# Patient Record
Sex: MALE | Race: WHITE | NOT HISPANIC OR LATINO
[De-identification: names, ages, dates, MRNs, and addresses within clinical notes are randomized per-mention and may not be internally consistent; named-entity substitution may affect disease eponyms.]

---

## 2017-05-08 PROBLEM — Z00.00 ENCOUNTER FOR PREVENTIVE HEALTH EXAMINATION: Status: ACTIVE | Noted: 2017-05-08

## 2017-05-12 ENCOUNTER — LABORATORY RESULT (OUTPATIENT)
Age: 67
End: 2017-05-12

## 2017-05-12 ENCOUNTER — APPOINTMENT (OUTPATIENT)
Dept: RHEUMATOLOGY | Facility: CLINIC | Age: 67
End: 2017-05-12

## 2017-05-12 VITALS
RESPIRATION RATE: 16 BRPM | TEMPERATURE: 98.2 F | BODY MASS INDEX: 26.32 KG/M2 | WEIGHT: 188 LBS | DIASTOLIC BLOOD PRESSURE: 96 MMHG | HEIGHT: 71 IN | HEART RATE: 64 BPM | SYSTOLIC BLOOD PRESSURE: 140 MMHG

## 2017-05-12 DIAGNOSIS — R51 HEADACHE: ICD-10-CM

## 2017-05-12 DIAGNOSIS — Z78.9 OTHER SPECIFIED HEALTH STATUS: ICD-10-CM

## 2017-05-12 DIAGNOSIS — Z87.891 PERSONAL HISTORY OF NICOTINE DEPENDENCE: ICD-10-CM

## 2017-05-12 DIAGNOSIS — Z80.0 FAMILY HISTORY OF MALIGNANT NEOPLASM OF DIGESTIVE ORGANS: ICD-10-CM

## 2017-05-12 DIAGNOSIS — M19.90 UNSPECIFIED OSTEOARTHRITIS, UNSPECIFIED SITE: ICD-10-CM

## 2017-05-12 DIAGNOSIS — Z82.61 FAMILY HISTORY OF ARTHRITIS: ICD-10-CM

## 2017-05-13 PROBLEM — R51 SCALP TENDERNESS: Status: ACTIVE | Noted: 2017-05-13

## 2017-05-13 LAB
APPEARANCE: CLEAR
BACTERIA: NEGATIVE
BASOPHILS # BLD AUTO: 0.03 K/UL
BASOPHILS NFR BLD AUTO: 0.4 %
BILIRUBIN URINE: NEGATIVE
BLOOD URINE: NEGATIVE
CK SERPL-CCNC: 46 U/L
COLOR: YELLOW
CRP SERPL-MCNC: <0.2 MG/DL
ENA SS-A AB SER IA-ACNC: <0.2 AL
ENA SS-B AB SER IA-ACNC: <0.2 AL
EOSINOPHIL # BLD AUTO: 0.24 K/UL
EOSINOPHIL NFR BLD AUTO: 3.4 %
FERRITIN SERPL-MCNC: 335 NG/ML
FOLATE SERPL-MCNC: 13.1 NG/ML
GLUCOSE QUALITATIVE U: NORMAL MG/DL
HAV IGM SER QL: NONREACTIVE
HBV CORE IGM SER QL: NONREACTIVE
HBV SURFACE AG SER QL: NONREACTIVE
HCT VFR BLD CALC: 42.3 %
HCV AB SER QL: NONREACTIVE
HCV S/CO RATIO: 0.09 S/CO
HGB BLD-MCNC: 14.3 G/DL
IMM GRANULOCYTES NFR BLD AUTO: 0.3 %
KETONES URINE: NEGATIVE
LDH SERPL-CCNC: 142 U/L
LEUKOCYTE ESTERASE URINE: NEGATIVE
LYMPHOCYTES # BLD AUTO: 1.85 K/UL
LYMPHOCYTES NFR BLD AUTO: 26.6 %
MAGNESIUM SERPL-MCNC: 2 MG/DL
MAN DIFF?: NORMAL
MCHC RBC-ENTMCNC: 22.2 PG
MCHC RBC-ENTMCNC: 33.8 GM/DL
MCV RBC AUTO: 65.7 FL
MICROSCOPIC-UA: NORMAL
MONOCYTES # BLD AUTO: 0.6 K/UL
MONOCYTES NFR BLD AUTO: 8.6 %
NEUTROPHILS # BLD AUTO: 4.22 K/UL
NEUTROPHILS NFR BLD AUTO: 60.7 %
NITRITE URINE: NEGATIVE
PH URINE: 5.5
PHOSPHATE SERPL-MCNC: 2.6 MG/DL
PLATELET # BLD AUTO: 215 K/UL
PROTEIN URINE: NEGATIVE MG/DL
RBC # BLD: 6.44 M/UL
RBC # BLD: 6.44 M/UL
RBC # FLD: 17.1 %
RED BLOOD CELLS URINE: 0 /HPF
RETICS # AUTO: 1.7 %
RETICS AGGREG/RBC NFR: 107.4 K/UL
RHEUMATOID FACT SER QL: 10 IU/ML
SPECIFIC GRAVITY URINE: 1.02
SQUAMOUS EPITHELIAL CELLS: 0 /HPF
T3 SERPL-MCNC: 86 NG/DL
T3RU NFR SERPL: 0.96 INDEX
T4 SERPL-MCNC: 6.7 UG/DL
THYROGLOB AB SERPL-ACNC: <20 IU/ML
THYROPEROXIDASE AB SERPL IA-ACNC: 278 IU/ML
TSH SERPL-ACNC: 2.93 UIU/ML
URATE SERPL-MCNC: 5 MG/DL
UROBILINOGEN URINE: NORMAL MG/DL
VIT B12 SERPL-MCNC: 325 PG/ML
WBC # FLD AUTO: 6.96 K/UL
WESR: 2
WHITE BLOOD CELLS URINE: 1 /HPF

## 2017-05-14 LAB
ANA PAT FLD IF-IMP: ABNORMAL
ANA SER IF-ACNC: ABNORMAL
CCP AB SER IA-ACNC: <8 UNITS
DSDNA AB SER-ACNC: <12 IU/ML
IF BLOCK AB SER QL: NORMAL
MPO AB + PR3 PNL SER: NORMAL
RF+CCP IGG SER-IMP: NEGATIVE

## 2017-05-15 LAB
ALBUMIN SERPL ELPH-MCNC: 4.6 G/DL
ALP BLD-CCNC: 74 U/L
ALT SERPL-CCNC: 10 U/L
ANION GAP SERPL CALC-SCNC: 14 MMOL/L
AST SERPL-CCNC: 15 U/L
BILIRUB SERPL-MCNC: 0.8 MG/DL
BUN SERPL-MCNC: 14 MG/DL
CALCIUM SERPL-MCNC: 9.8 MG/DL
CHLORIDE SERPL-SCNC: 100 MMOL/L
CO2 SERPL-SCNC: 24 MMOL/L
CREAT SERPL-MCNC: 0.93 MG/DL
GLUCOSE SERPL-MCNC: 92 MG/DL
POTASSIUM SERPL-SCNC: 4.8 MMOL/L
PROT SERPL-MCNC: 7.2 G/DL
SODIUM SERPL-SCNC: 138 MMOL/L

## 2017-05-16 LAB
ACE BLD-CCNC: 37 U/L
B BURGDOR IGG+IGM SER QL IB: NORMAL
C3 SERPL-MCNC: 115 MG/DL
C4 SERPL-MCNC: 18 MG/DL
DEPRECATED KAPPA LC FREE/LAMBDA SER: 0.96 RATIO
IGA SER QL IEP: 392 MG/DL
IGG SER QL IEP: 970 MG/DL
IGM SER QL IEP: 64 MG/DL
KAPPA LC CSF-MCNC: 1.77 MG/DL
KAPPA LC SERPL-MCNC: 1.7 MG/DL
M PROTEIN SPEC IFE-MCNC: NORMAL

## 2017-05-17 LAB
MYELOPEROXIDASE AB SER QL IA: <5 UNITS
MYELOPEROXIDASE CELLS FLD QL: NEGATIVE
PROTEINASE3 AB SER IA-ACNC: <5 UNITS
PROTEINASE3 AB SER-ACNC: NEGATIVE

## 2017-05-23 ENCOUNTER — TRANSCRIPTION ENCOUNTER (OUTPATIENT)
Age: 67
End: 2017-05-23

## 2017-05-27 LAB
HGB A MFR BLD: 87.8 %
HGB A2 MFR BLD: 2.3 %
HGB FRACT BLD-IMP: NORMAL
HGB OTHER MFR BLD ELPH: 9.9 %
HGB S BLD QL SOLY: NEGATIVE

## 2017-06-26 ENCOUNTER — APPOINTMENT (OUTPATIENT)
Dept: RHEUMATOLOGY | Facility: CLINIC | Age: 67
End: 2017-06-26

## 2017-06-26 VITALS
TEMPERATURE: 98.4 F | SYSTOLIC BLOOD PRESSURE: 140 MMHG | HEART RATE: 66 BPM | WEIGHT: 188 LBS | HEIGHT: 71 IN | DIASTOLIC BLOOD PRESSURE: 90 MMHG | BODY MASS INDEX: 26.32 KG/M2 | RESPIRATION RATE: 17 BRPM | OXYGEN SATURATION: 98 %

## 2017-06-26 DIAGNOSIS — Z86.69 PERSONAL HISTORY OF OTHER DISEASES OF THE NERVOUS SYSTEM AND SENSE ORGANS: ICD-10-CM

## 2017-06-26 DIAGNOSIS — M21.951 UNSPECIFIED ACQUIRED DEFORMITY OF RIGHT THIGH: ICD-10-CM

## 2017-06-26 DIAGNOSIS — M21.961 UNSPECIFIED ACQUIRED DEFORMITY OF RIGHT LOWER LEG: ICD-10-CM

## 2017-06-26 DIAGNOSIS — M25.50 PAIN IN UNSPECIFIED JOINT: ICD-10-CM

## 2017-06-26 DIAGNOSIS — M21.952 UNSPECIFIED ACQUIRED DEFORMITY OF RIGHT THIGH: ICD-10-CM

## 2017-06-26 RX ORDER — DICLOFENAC SODIUM 75 MG/1
75 TABLET, DELAYED RELEASE ORAL
Refills: 0 | Status: DISCONTINUED | COMMUNITY
End: 2017-06-26

## 2017-07-24 ENCOUNTER — OUTPATIENT (OUTPATIENT)
Dept: OUTPATIENT SERVICES | Facility: HOSPITAL | Age: 67
LOS: 1 days | End: 2017-07-24
Payer: MEDICARE

## 2017-07-24 ENCOUNTER — APPOINTMENT (OUTPATIENT)
Dept: RADIOLOGY | Facility: CLINIC | Age: 67
End: 2017-07-24

## 2017-07-24 DIAGNOSIS — M25.50 PAIN IN UNSPECIFIED JOINT: ICD-10-CM

## 2017-07-24 PROCEDURE — 71020: CPT | Mod: 26

## 2017-07-24 PROCEDURE — 71046 X-RAY EXAM CHEST 2 VIEWS: CPT

## 2017-07-27 DIAGNOSIS — J39.8 OTHER SPECIFIED DISEASES OF UPPER RESPIRATORY TRACT: ICD-10-CM

## 2017-07-30 PROBLEM — J39.8 TRACHEAL DEVIATION: Status: ACTIVE | Noted: 2017-07-30

## 2017-08-07 ENCOUNTER — CLINICAL ADVICE (OUTPATIENT)
Age: 67
End: 2017-08-07

## 2017-08-18 ENCOUNTER — OUTPATIENT (OUTPATIENT)
Dept: OUTPATIENT SERVICES | Facility: HOSPITAL | Age: 67
LOS: 1 days | End: 2017-08-18
Payer: MEDICARE

## 2017-08-18 ENCOUNTER — APPOINTMENT (OUTPATIENT)
Dept: CT IMAGING | Facility: CLINIC | Age: 67
End: 2017-08-18
Payer: MEDICARE

## 2017-08-18 DIAGNOSIS — J39.8 OTHER SPECIFIED DISEASES OF UPPER RESPIRATORY TRACT: ICD-10-CM

## 2017-08-18 PROCEDURE — 71250 CT THORAX DX C-: CPT | Mod: 26

## 2017-08-18 PROCEDURE — 71250 CT THORAX DX C-: CPT

## 2017-09-09 ENCOUNTER — TRANSCRIPTION ENCOUNTER (OUTPATIENT)
Age: 67
End: 2017-09-09

## 2018-01-11 ENCOUNTER — TRANSCRIPTION ENCOUNTER (OUTPATIENT)
Age: 68
End: 2018-01-11

## 2018-01-17 ENCOUNTER — TRANSCRIPTION ENCOUNTER (OUTPATIENT)
Age: 68
End: 2018-01-17

## 2018-01-23 ENCOUNTER — APPOINTMENT (OUTPATIENT)
Dept: MRI IMAGING | Facility: CLINIC | Age: 68
End: 2018-01-23
Payer: MEDICARE

## 2018-01-23 ENCOUNTER — OUTPATIENT (OUTPATIENT)
Dept: OUTPATIENT SERVICES | Facility: HOSPITAL | Age: 68
LOS: 1 days | End: 2018-01-23
Payer: MEDICARE

## 2018-01-23 DIAGNOSIS — Z00.8 ENCOUNTER FOR OTHER GENERAL EXAMINATION: ICD-10-CM

## 2018-01-23 PROCEDURE — 73721 MRI JNT OF LWR EXTRE W/O DYE: CPT | Mod: 26,RT

## 2018-01-23 PROCEDURE — 73721 MRI JNT OF LWR EXTRE W/O DYE: CPT

## 2020-05-13 ENCOUNTER — APPOINTMENT (OUTPATIENT)
Dept: DERMATOLOGY | Facility: CLINIC | Age: 70
End: 2020-05-13

## 2020-06-01 ENCOUNTER — TRANSCRIPTION ENCOUNTER (OUTPATIENT)
Age: 70
End: 2020-06-01

## 2020-06-22 ENCOUNTER — TRANSCRIPTION ENCOUNTER (OUTPATIENT)
Age: 70
End: 2020-06-22

## 2020-06-29 ENCOUNTER — RESULT REVIEW (OUTPATIENT)
Age: 70
End: 2020-06-29

## 2020-06-30 ENCOUNTER — APPOINTMENT (OUTPATIENT)
Dept: DERMATOLOGY | Facility: CLINIC | Age: 70
End: 2020-06-30
Payer: MEDICARE

## 2020-06-30 PROCEDURE — 11102 TANGNTL BX SKIN SINGLE LES: CPT

## 2020-06-30 PROCEDURE — 99203 OFFICE O/P NEW LOW 30 MIN: CPT | Mod: 25

## 2020-07-19 ENCOUNTER — TRANSCRIPTION ENCOUNTER (OUTPATIENT)
Age: 70
End: 2020-07-19

## 2020-07-20 ENCOUNTER — OUTPATIENT (OUTPATIENT)
Dept: OUTPATIENT SERVICES | Facility: HOSPITAL | Age: 70
LOS: 1 days | End: 2020-07-20
Payer: MEDICARE

## 2020-07-20 ENCOUNTER — APPOINTMENT (OUTPATIENT)
Dept: RADIOLOGY | Facility: CLINIC | Age: 70
End: 2020-07-20
Payer: MEDICARE

## 2020-07-20 DIAGNOSIS — Z00.8 ENCOUNTER FOR OTHER GENERAL EXAMINATION: ICD-10-CM

## 2020-07-20 DIAGNOSIS — M81.0 AGE-RELATED OSTEOPOROSIS WITHOUT CURRENT PATHOLOGICAL FRACTURE: ICD-10-CM

## 2020-07-20 PROCEDURE — 77080 DXA BONE DENSITY AXIAL: CPT

## 2020-07-20 PROCEDURE — 77080 DXA BONE DENSITY AXIAL: CPT | Mod: 26

## 2020-07-23 DIAGNOSIS — Z01.818 ENCOUNTER FOR OTHER PREPROCEDURAL EXAMINATION: ICD-10-CM

## 2020-07-27 ENCOUNTER — APPOINTMENT (OUTPATIENT)
Dept: DISASTER EMERGENCY | Facility: CLINIC | Age: 70
End: 2020-07-27

## 2020-07-28 ENCOUNTER — APPOINTMENT (OUTPATIENT)
Dept: CARDIOLOGY | Facility: CLINIC | Age: 70
End: 2020-07-28
Payer: MEDICARE

## 2020-07-28 VITALS
BODY MASS INDEX: 27.3 KG/M2 | OXYGEN SATURATION: 95 % | WEIGHT: 195 LBS | SYSTOLIC BLOOD PRESSURE: 159 MMHG | HEIGHT: 71 IN | DIASTOLIC BLOOD PRESSURE: 87 MMHG | HEART RATE: 62 BPM

## 2020-07-28 DIAGNOSIS — Z82.49 FAMILY HISTORY OF ISCHEMIC HEART DISEASE AND OTHER DISEASES OF THE CIRCULATORY SYSTEM: ICD-10-CM

## 2020-07-28 DIAGNOSIS — R07.9 CHEST PAIN, UNSPECIFIED: ICD-10-CM

## 2020-07-28 LAB — SARS-COV-2 N GENE NPH QL NAA+PROBE: NOT DETECTED

## 2020-07-28 PROCEDURE — 99204 OFFICE O/P NEW MOD 45 MIN: CPT

## 2020-07-28 RX ORDER — ATORVASTATIN CALCIUM 40 MG/1
40 TABLET, FILM COATED ORAL DAILY
Qty: 90 | Refills: 3 | Status: ACTIVE | COMMUNITY
Start: 2020-07-28 | End: 1900-01-01

## 2020-07-28 RX ORDER — ASPIRIN 81 MG/1
81 TABLET ORAL
Qty: 30 | Refills: 11 | Status: ACTIVE | COMMUNITY
Start: 2020-07-28 | End: 1900-01-01

## 2020-07-30 ENCOUNTER — APPOINTMENT (OUTPATIENT)
Dept: DERMATOLOGY | Facility: CLINIC | Age: 70
End: 2020-07-30
Payer: MEDICARE

## 2020-07-30 PROCEDURE — 17311 MOHS 1 STAGE H/N/HF/G: CPT

## 2020-07-30 PROCEDURE — 17312 MOHS ADDL STAGE: CPT

## 2020-07-30 PROCEDURE — 14061 TIS TRNFR E/N/E/L10.1-30SQCM: CPT

## 2020-08-03 ENCOUNTER — APPOINTMENT (OUTPATIENT)
Dept: DERMATOLOGY | Facility: CLINIC | Age: 70
End: 2020-08-03
Payer: MEDICARE

## 2020-08-03 VITALS — TEMPERATURE: 97.3 F

## 2020-08-03 PROCEDURE — 99024 POSTOP FOLLOW-UP VISIT: CPT

## 2020-08-03 RX ORDER — MUPIROCIN 20 MG/G
2 OINTMENT TOPICAL
Qty: 1 | Refills: 0 | Status: ACTIVE | COMMUNITY
Start: 2020-08-03 | End: 1900-01-01

## 2020-08-13 ENCOUNTER — APPOINTMENT (OUTPATIENT)
Dept: DERMATOLOGY | Facility: CLINIC | Age: 70
End: 2020-08-13
Payer: MEDICARE

## 2020-08-13 DIAGNOSIS — C44.311 BASAL CELL CARCINOMA OF SKIN OF NOSE: ICD-10-CM

## 2020-08-13 PROCEDURE — 99024 POSTOP FOLLOW-UP VISIT: CPT

## 2020-08-21 ENCOUNTER — APPOINTMENT (OUTPATIENT)
Dept: CV DIAGNOSTICS | Facility: HOSPITAL | Age: 70
End: 2020-08-21

## 2020-08-21 ENCOUNTER — APPOINTMENT (OUTPATIENT)
Dept: CV DIAGNOSITCS | Facility: HOSPITAL | Age: 70
End: 2020-08-21

## 2020-08-21 ENCOUNTER — OUTPATIENT (OUTPATIENT)
Dept: OUTPATIENT SERVICES | Facility: HOSPITAL | Age: 70
LOS: 1 days | End: 2020-08-21
Payer: MEDICARE

## 2020-08-21 DIAGNOSIS — R07.9 CHEST PAIN, UNSPECIFIED: ICD-10-CM

## 2020-08-21 PROCEDURE — 93018 CV STRESS TEST I&R ONLY: CPT

## 2020-08-21 PROCEDURE — 93306 TTE W/DOPPLER COMPLETE: CPT

## 2020-08-21 PROCEDURE — 93017 CV STRESS TEST TRACING ONLY: CPT

## 2020-08-21 PROCEDURE — 93306 TTE W/DOPPLER COMPLETE: CPT | Mod: 26

## 2020-08-21 PROCEDURE — 93016 CV STRESS TEST SUPVJ ONLY: CPT

## 2020-09-17 ENCOUNTER — APPOINTMENT (OUTPATIENT)
Dept: DERMATOLOGY | Facility: CLINIC | Age: 70
End: 2020-09-17
Payer: MEDICARE

## 2020-09-17 DIAGNOSIS — Z85.828 PERSONAL HISTORY OF OTHER MALIGNANT NEOPLASM OF SKIN: ICD-10-CM

## 2020-09-17 PROCEDURE — 99024 POSTOP FOLLOW-UP VISIT: CPT

## 2020-09-18 PROBLEM — Z85.828 HISTORY OF BASAL CELL CARCINOMA (BCC): Status: ACTIVE | Noted: 2020-09-18

## 2020-10-12 PROBLEM — R07.9 CHEST PAIN: Status: ACTIVE | Noted: 2020-07-28

## 2020-10-12 NOTE — REVIEW OF SYSTEMS
Forms completed, signed, copy made for chart and faxed as requested.  Janae Holman,            [see HPI] : see HPI [Negative] : Heme/Lymph

## 2020-10-12 NOTE — DISCUSSION/SUMMARY
[FreeTextEntry1] : Mr. Bhat is a 69 year-old man with angina.\par \par Plan:\par 1. Chest pain: (unstable): TTE and stress test\par 2. HTN: monitor with off meds for now\par 3. HLD: Atorvastatin\par 4. Old records requested and reviewed with performing physician.\par 5. Primary and secondary prevention of cardiovascular and related conditions discussed at length, including but not limited to diet and lifestyle modification.\par 6. Patient to return to the office in 3 months.\par \par Thank you for allowing me to participate in the care of your patient. If you have any questions, please feel free to contact me at [phone] or via email at [email or web address].\par \par Sincerely,\par \par Candace Monk MD [facility]\par Director of Interventional Cardiology\par Director CHIP/ Program

## 2020-10-12 NOTE — PHYSICAL EXAM
[General Appearance - Well Developed] : well developed [Normal Appearance] : normal appearance [Well Groomed] : well groomed [General Appearance - Well Nourished] : well nourished [No Deformities] : no deformities [General Appearance - In No Acute Distress] : no acute distress [Normal Conjunctiva] : the conjunctiva exhibited no abnormalities [Eyelids - No Xanthelasma] : the eyelids demonstrated no xanthelasmas [Normal Oral Mucosa] : normal oral mucosa [No Oral Pallor] : no oral pallor [No Oral Cyanosis] : no oral cyanosis [Normal Jugular Venous A Waves Present] : normal jugular venous A waves present [Normal Jugular Venous V Waves Present] : normal jugular venous V waves present [No Jugular Venous Green A Waves] : no jugular venous green A waves [Heart Rate And Rhythm] : heart rate and rhythm were normal [Heart Sounds] : normal S1 and S2 [Murmurs] : no murmurs present [Respiration, Rhythm And Depth] : normal respiratory rhythm and effort [Exaggerated Use Of Accessory Muscles For Inspiration] : no accessory muscle use [Auscultation Breath Sounds / Voice Sounds] : lungs were clear to auscultation bilaterally [Abdomen Soft] : soft [Abdomen Tenderness] : non-tender [Abdomen Mass (___ Cm)] : no abdominal mass palpated [Abnormal Walk] : normal gait [Gait - Sufficient For Exercise Testing] : the gait was sufficient for exercise testing [Nail Clubbing] : no clubbing of the fingernails [Cyanosis, Localized] : no localized cyanosis [Petechial Hemorrhages (___cm)] : no petechial hemorrhages [Skin Color & Pigmentation] : normal skin color and pigmentation [] : no rash [No Venous Stasis] : no venous stasis [Skin Lesions] : no skin lesions [No Skin Ulcers] : no skin ulcer [No Xanthoma] : no  xanthoma was observed [Oriented To Time, Place, And Person] : oriented to person, place, and time [Affect] : the affect was normal [Mood] : the mood was normal [No Anxiety] : not feeling anxious

## 2020-10-12 NOTE — HISTORY OF PRESENT ILLNESS
[FreeTextEntry1] : Mr. Bhat is a 69 year-old man with known HLD and ?BPH who presents with angina/chest pain that is not always typical. He notes being able to walk but dyspnea occurs as well.

## 2020-10-12 NOTE — REASON FOR VISIT
[Consultation] : a consultation regarding [Angina Pectoris] : angina pectoris [Chest Pain] : chest pain

## 2021-07-28 ENCOUNTER — RX RENEWAL (OUTPATIENT)
Age: 71
End: 2021-07-28

## 2021-10-20 ENCOUNTER — TRANSCRIPTION ENCOUNTER (OUTPATIENT)
Age: 71
End: 2021-10-20

## 2022-03-14 ENCOUNTER — APPOINTMENT (OUTPATIENT)
Dept: DERMATOLOGY | Facility: CLINIC | Age: 72
End: 2022-03-14
Payer: MEDICARE

## 2022-03-14 PROCEDURE — 99213 OFFICE O/P EST LOW 20 MIN: CPT

## 2022-05-22 ENCOUNTER — NON-APPOINTMENT (OUTPATIENT)
Age: 72
End: 2022-05-22

## 2022-08-23 ENCOUNTER — APPOINTMENT (OUTPATIENT)
Dept: ULTRASOUND IMAGING | Facility: CLINIC | Age: 72
End: 2022-08-23

## 2022-08-23 ENCOUNTER — OUTPATIENT (OUTPATIENT)
Dept: OUTPATIENT SERVICES | Facility: HOSPITAL | Age: 72
LOS: 1 days | End: 2022-08-23
Payer: MEDICARE

## 2022-08-23 DIAGNOSIS — Z00.8 ENCOUNTER FOR OTHER GENERAL EXAMINATION: ICD-10-CM

## 2022-08-23 PROCEDURE — 93975 VASCULAR STUDY: CPT

## 2022-08-23 PROCEDURE — 76870 US EXAM SCROTUM: CPT | Mod: 26

## 2022-08-23 PROCEDURE — 93975 VASCULAR STUDY: CPT | Mod: 26

## 2022-08-23 PROCEDURE — 76870 US EXAM SCROTUM: CPT

## 2022-11-02 ENCOUNTER — APPOINTMENT (OUTPATIENT)
Dept: ORTHOPEDIC SURGERY | Facility: CLINIC | Age: 72
End: 2022-11-02

## 2022-11-02 VITALS — WEIGHT: 180 LBS | HEIGHT: 71 IN | BODY MASS INDEX: 25.2 KG/M2

## 2022-11-02 DIAGNOSIS — E78.00 PURE HYPERCHOLESTEROLEMIA, UNSPECIFIED: ICD-10-CM

## 2022-11-02 DIAGNOSIS — M25.512 PAIN IN LEFT SHOULDER: ICD-10-CM

## 2022-11-02 DIAGNOSIS — I10 ESSENTIAL (PRIMARY) HYPERTENSION: ICD-10-CM

## 2022-11-02 PROCEDURE — 72040 X-RAY EXAM NECK SPINE 2-3 VW: CPT

## 2022-11-02 PROCEDURE — 73030 X-RAY EXAM OF SHOULDER: CPT | Mod: LT

## 2022-11-02 PROCEDURE — 99204 OFFICE O/P NEW MOD 45 MIN: CPT

## 2022-11-02 NOTE — PHYSICAL EXAM
[NL (45)] : forward flexion 45 degrees [5___] : right grasp 5[unfilled]/5 [Left] : left shoulder [There are no fractures, subluxations or dislocations. No significant abnormalities are seen] : There are no fractures, subluxations or dislocations. No significant abnormalities are seen [de-identified] : A. General\par 1. .vit:\par Constitutional:\par - General Appearance:\par Unremarkable\par Body Habitus\par Well Developed\par Well Nourished\par Body Habitus\par No Deformities\par Well Groomed\par Ability To communicate:\par Normal\par Neurologic:\par Global sensation is intact to upper and lower extremities. See examination of Neck and/or Spine\par for exceptions.\par Orientation to Time, Place and Person is: Normal\par Mood And Affect is Normal\par Skin:\par - Head/Face, Right Upper/Lower Extremity, Left Upper/Lower Extremity: Normal\par See Examination of Neck and/or Spine for exceptions\par Cardiovascular:\par Peripheral Cardiovascular System is Normal\par Palpation of Lymph Nodes:\par Normal Palpation of lymph nodes in: Axilla, Cervical, Inguinal\par Abnormal Palpation of lymph nodes in: None [FreeTextEntry9] : ROM of the neck with crepitus [FreeTextEntry1] : cC-5 mild DDD, C5-7 moderate DDD with no listhesis  [de-identified] : extension 20 degrees [de-identified] : left lateral rotation 45 degrees [TWNoteComboBox6] : right lateral rotation 45 degrees [FreeTextEntry8] : no pain with ROM [] : negative tinel's [de-identified] : negative wallace

## 2022-11-02 NOTE — ASSESSMENT
[FreeTextEntry1] : 73 y/o M with cervical DDD and radiculopathy. Patient has failed all forms of conservative treatment including rest, OTC NSIADS, chiro care and HEP for greater than 6 weeks, and is now indicated for MRI of cervical spine to stenosis. Patient was provided with a referral for cervical physical therapy to work on stretching, strengthening and range of motion. Patient was provided with a cervical home exercise program. Patient will follow up after imaging. \par \par Prior to appointment and during encounter with patient extensive medical records were reviewed including but not limited to, hospital records, outpatient records, imaging results, and lab data.During this appointment the patient was examined, diagnoses were discussed and explained in a face to face manner. In addition extensive time was spent reviewing aforementioned diagnostic studies. Counseling including abnormal image results, differential diagnoses, treatment options, risk and benefits, lifestyle changes, current condition, and current medications was performed.Patient's comments, questions, and concerns were addressed and patient verbalized understanding. Based on this patient's presentation at our office, which is an orthopedic spine surgeon's office, this patient inherently / intrinsically has a risk, however minute, of developing issues such as Cauda equina syndrome, bowel and bladder changes, or progression of motor or neurological deficits such as paralysis which may be permanent.\par \par I, Katie Najera, attest that this documentation has been prepared under the direction and in the presence of provider Dr. Sutton.\par

## 2022-11-02 NOTE — HISTORY OF PRESENT ILLNESS
[Gradual] : gradual [4] : 4 [7] : 7 [Burning] : burning [Radiating] : radiating [Tingling] : tingling [Frequent] : frequent [Sitting] : sitting [Lying in bed] : lying in bed [Part time] : Work status: part time [de-identified] : 11/2/22: 71 y/o M with cervical spine pain for the past year. No injury/trauma. Patient states he has pain in the left upper extremity that travels into his fingers. Patient states he also experiences tingling/numbness as well a a burning sensation in the arm. Pain is affecting his sleeping. He has had chiro care with mild relief but continues to have pain daily. Has taken OTC NSAIDS with no relief.  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

## 2022-11-14 ENCOUNTER — RESULT REVIEW (OUTPATIENT)
Age: 72
End: 2022-11-14

## 2022-11-14 ENCOUNTER — APPOINTMENT (OUTPATIENT)
Dept: MRI IMAGING | Facility: CLINIC | Age: 72
End: 2022-11-14

## 2022-11-14 ENCOUNTER — OUTPATIENT (OUTPATIENT)
Dept: OUTPATIENT SERVICES | Facility: HOSPITAL | Age: 72
LOS: 1 days | End: 2022-11-14
Payer: MEDICARE

## 2022-11-14 DIAGNOSIS — M54.12 RADICULOPATHY, CERVICAL REGION: ICD-10-CM

## 2022-11-14 PROCEDURE — 72141 MRI NECK SPINE W/O DYE: CPT | Mod: MH

## 2022-11-14 PROCEDURE — 72141 MRI NECK SPINE W/O DYE: CPT | Mod: 26,MH

## 2022-11-23 ENCOUNTER — APPOINTMENT (OUTPATIENT)
Dept: ORTHOPEDIC SURGERY | Facility: CLINIC | Age: 72
End: 2022-11-23

## 2022-11-23 VITALS — HEIGHT: 71 IN | WEIGHT: 180 LBS | BODY MASS INDEX: 25.2 KG/M2

## 2022-11-23 PROCEDURE — 99214 OFFICE O/P EST MOD 30 MIN: CPT

## 2022-12-07 ENCOUNTER — APPOINTMENT (OUTPATIENT)
Dept: PAIN MANAGEMENT | Facility: CLINIC | Age: 72
End: 2022-12-07

## 2022-12-07 VITALS — WEIGHT: 180 LBS | BODY MASS INDEX: 25.2 KG/M2 | HEIGHT: 71 IN

## 2022-12-07 DIAGNOSIS — M54.12 RADICULOPATHY, CERVICAL REGION: ICD-10-CM

## 2022-12-07 DIAGNOSIS — M54.2 CERVICALGIA: ICD-10-CM

## 2022-12-07 DIAGNOSIS — G89.29 CERVICALGIA: ICD-10-CM

## 2022-12-07 PROCEDURE — 99204 OFFICE O/P NEW MOD 45 MIN: CPT

## 2022-12-07 RX ORDER — VALSARTAN 160 MG/1
160 TABLET, COATED ORAL
Qty: 90 | Refills: 0 | Status: ACTIVE | COMMUNITY
Start: 2022-01-17

## 2022-12-07 NOTE — HISTORY OF PRESENT ILLNESS
[Neck] : neck [5] : 5 [10] : 10 [Dull/Aching] : dull/aching [Intermittent] : intermittent [Household chores] : household chores [Nothing helps with pain getting better] : Nothing helps with pain getting better [Sitting] : sitting [Standing] : standing [Walking] : walking [FreeTextEntry1] : Initial HPI: \par \par 12/07/2022: This is MARTIN MEJIA, a pleasant 72 year-old male referred to my office by Dr. Sutton for initial evaluation of neck pain \par \par Location of maximal pain: left trap/shoulder blade\par Onset: 2 years, chronic but gradually worsening\par Provoking factors: lying on right side\par Palliating factors:  distraction\par Radiation pattern:  down left arm, anterior forearm, into all 5 fingers\par Associated symptoms: +paresthesias, +numbness, and +weakness in left arm.  +difficulty with fine motor on left\par Patient denies bowel/bladder incontinence, saddle anesthesia, fevers, chills, weight loss, or recent falls.  \par \par Current Pain Medications:\par Advil - minimal relief\par \par Past Pain Medications:\par None\par \par \par Prior Procedures/Treatments: \par None\par \par \par History of back surgery: \par None\par \par Blood thinners:\par ASA 81mg - primary PPX\par \par \par Physical Therapy:\par currently participating and doing HEP regularly\par \par --------------------------------------------------------\par Imaging Review:\par \par 11/2022 MRI C Spine ([facility]) \par \par C1/2: No central canal narrowing.\par C2/C3: Moderate left and mild to moderate facet hypertrophic changes. \par Moderate left foraminal narrowing. No central canal narrowing.\par C3/C4: Severe bilateral facet arthropathy. Moderate bilateral \par uncovertebral hypertrophy. Severe bilateral foraminal narrowing. Mild \par central canal narrowing.\par C4/C5: Severe left and mild right facet hypertrophic changes. Mild left \par uncovertebral hypertrophy. Severe left and moderate right foraminal \par narrowing. No central canal narrowing.\par C5/C6: Moderate posterior osseous ridging. Moderate bilateral facet \par hypertrophic changes. Moderate to severe bilateral foraminal narrowing. \par Mild to moderate central canal stenosis.\par C6/C7: Posterior osseous ridging effacing the ventral thecal sac. Facet \par and uncovertebral hypertrophy is worse on the left. Severe left and mild \par right foraminal narrowing. Mild central canal narrowing.\par C7/T1: No central canal or foraminal narrowing.\par \par ALIGNMENT: Mild reversal the normal cervical lordosis with apex at C4. \par Varying levels of disc space narrowing.\par CORD: No abnormal signal in the spinal cord\par MARROW: There is marked reactive bone marrow edema in the left C4 and C5 \par facets suggestive of a moderate to severe stress reaction.\par IMAGED BRAIN: Cervicomedullary junction is intact.\par PERIPHERAL/NECK SOFT TISSUES: Symmetric appearance of the paraspinal \par musculature. Prevertebral soft tissues are preserved.\par \par IMPRESSION:\par 1.  Moderate multilevel spondylosis with mild to moderate central canal \par narrowing at C5-6.\par 2.  Marked marrow edema in the left C4 and C5 facets suggestive of a \par moderate to severe stress reaction.\par \par \par \par \par All pertinent imaging independently reviewed and interpreted by me.  \par \par  [] : This patient has had an injection before: no [FreeTextEntry7] : left shoulder  [de-identified] : l mri

## 2022-12-07 NOTE — PHYSICAL EXAM
[de-identified] : General:  Awake and alert in no acute distress, \par Psych: normal mood and affect. \par HEENT: NC/AT, normal visual tracking\par Pulmonary: no resp distress, chest expansion appears symmetrical\par CV: extremities are warm and perfused\par Abd: non-distended\par Ext: no c/c/e\par \par Gait: WNL\par \par \par CERVICAL SPINE REGION:\par Inspection, cervical: normal, no listing of the head, no gross asymmetries.\par \par Active ROM of the cervical spine:\par Flexion:  full, painless\par Extension:     full, painless                          \par Right- Side-bending: full, painless\par Left-Side-bending: .full, painless		\par Rotation - Right:  full, painless    \par Rotation - Left: full, painless		\par Oblique extension: Right- full, painless \par Oblique extension: Left-  full, painless\par -ve Lhermitte's sign\par 		\par Palpation:  Tender at Cervical paraspinals\par 		\par Reflexes: Upper limbs:		RIGHT	   LEFT	\par Biceps	C5-6		                2+	    2+\par Brachioradialis	C5-6		2+                2+\par Triceps	C(6)7-8(1)		2+	    2+\par 		\par Strength, upper limbs: 	\par 5/5 in SA, EF, EE, WE, ADM, APB bilaterally \par 		\par Sensation: Upper limbs:\par Intact to light touch over C3-T1 bilateral UE dermatomes \par 		\par Tests for cervical radiculopathy/myelopathy: 	\par Spurling’s sign: negative bilaterally\par \par Long tract signs for myelopathy/UMN process:                          \par Castillo sign:                                          negative bilaterally			\par \par \par \par

## 2022-12-07 NOTE — ASSESSMENT
[FreeTextEntry1] : This is MARTIN MEJIA,  a 72 year-old male here for neck pain with impairment in ADLs and functionality.  The pain has not responded to conservative care including NSAID therapy and/or physical therapy.  There is no bleeding tendency, unstable medical condition, or systemic infection.\par \par Based on history and physical, I suspect there are likely multiple pain generators, including multilevel cervical spondylosis with mild/moderate central narrowing at C5/6 and facet arthropathy at C4/5. \par \par I discussed the above at length with the patient, including prognosis, complications, and red flag symptoms.  We discussed a graded and multidisciplinary treatment plan, including physical therapy/HEP, medications, and/or interventional procedures.  The risks and benefits of each of these were addressed and all questions answered to the patient's apparent satisfaction.  After this discussion, the following plan was developed with the patient: \par \par 1. PT: Emphasized importance of PT/HEP as a mainstay of treatment. Pt to continue. \par 2.  Medication(s): continue home meds\par 3.  Imaging/Labs: reviewed as above\par 4.  Procedure(s): Recommend C7/T1 FRITZ with fluoroscopic guidance. hold ASA 6d prior and 1d after (is on for primary ppx)\par 5.  Follow-Up: for procedure, then 2 weeks after. \par \par The risks, benefits and alternatives of the proposed procedure were explained in detail with the patient. The risks outlined include but are not limited to infection, bleeding, post- dural puncture headache (for CECILIA), nerve injury, a temporary increase in pain, failure to resolve symptoms, allergic reaction, and possible elevation of blood sugar in diabetics if using corticosteroid.  All questions were answered to patient's apparent satisfaction and he/she verbalized an understanding.\par \par Medications have been discussed and reconciled, adverse reactions and side effects have been reviewed with patient.  When appropriate, iSTOP/ has been checked and any aberrations discussed with patient.  \par \par

## 2022-12-12 ENCOUNTER — NON-APPOINTMENT (OUTPATIENT)
Age: 72
End: 2022-12-12

## 2022-12-18 ENCOUNTER — NON-APPOINTMENT (OUTPATIENT)
Age: 72
End: 2022-12-18

## 2023-01-10 ENCOUNTER — NON-APPOINTMENT (OUTPATIENT)
Age: 73
End: 2023-01-10

## 2023-03-06 DIAGNOSIS — M25.552 PAIN IN LEFT HIP: ICD-10-CM

## 2023-03-07 ENCOUNTER — APPOINTMENT (OUTPATIENT)
Dept: ORTHOPEDIC SURGERY | Facility: CLINIC | Age: 73
End: 2023-03-07
Payer: MEDICARE

## 2023-03-07 VITALS
WEIGHT: 185 LBS | HEIGHT: 71 IN | DIASTOLIC BLOOD PRESSURE: 82 MMHG | HEART RATE: 67 BPM | BODY MASS INDEX: 25.9 KG/M2 | SYSTOLIC BLOOD PRESSURE: 159 MMHG

## 2023-03-07 DIAGNOSIS — M70.62 TROCHANTERIC BURSITIS, LEFT HIP: ICD-10-CM

## 2023-03-07 PROCEDURE — 99204 OFFICE O/P NEW MOD 45 MIN: CPT

## 2023-03-07 PROCEDURE — 73502 X-RAY EXAM HIP UNI 2-3 VIEWS: CPT

## 2023-03-07 RX ORDER — DICLOFENAC SODIUM 1% 10 MG/G
1 GEL TOPICAL DAILY
Qty: 1 | Refills: 0 | Status: ACTIVE | COMMUNITY
Start: 2023-03-07 | End: 1900-01-01

## 2023-03-07 NOTE — PHYSICAL EXAM
[de-identified] : GENERAL APPEARANCE: Well nourished and hydrated, pleasant, alert, and oriented x 3. Appears their stated age. \par HEENT: Normocephalic, extraocular eye motion intact. Nasal septum midline. Oral cavity clear. External auditory canal clear. \par RESPIRATORY: Breath sounds clear and audible in all lobes. No wheezing, No accessory muscle use.\par CARDIOVASCULAR: No apparent abnormalities. No lower leg edema. No varicosities. Pedal pulses are palpable.\par NEUROLOGIC: Sensation is normal, no muscle weakness in the upper or lower extremities.\par DERMATOLOGIC: No apparent skin lesions, moist, warm, no rash.\par SPINE: Cervical spine appears normal and moves freely; thoracic spine appears normal and moves freely; lumbosacral spine appears normal and moves freely, normal, nontender.\par MUSCULOSKELETAL: Hands, wrists, and elbows are normal and move freely, shoulders are normal and move freely. \par Psychiatric: Oriented to person, place, and time, insight and judgement were intact and the affect was normal. \par Musculoskeletal: ambulates normally. Left hip exam showed no groin pain with SLR, ROM is full without pain, CAMERON negative, FADIR negative.  There is tenderness palpation over the left greater trochanter\par 5/5 motor strength in bilateral lower extremities. Sensory: Intact in bilateral lower extremities. DTRs: Biceps, brachioradialis, triceps, patellar, ankle and plantar 2+ and symmetric bilaterally. Pulses: dorsalis pedis, posterior tibial, femoral, popliteal, and radial 2+ and symmetric bilaterally. \par  [de-identified] : AP pelvis and 2 views of the left hip obtained the office today show no acute fracture or dislocation.  Mild degenerative changes noted with small osteophyte formation along the

## 2023-03-07 NOTE — HISTORY OF PRESENT ILLNESS
[Stable] : stable [Sitting] : sitting [Standing] : standing [Daily] : ~He/She~ states the symptoms seem to be occuring daily [Direct Pressure] : worsened by direct pressure [Walking] : worsened by walking [NSAIDs] : relieved by nonsteroidal anti-inflammatory drugs [de-identified] : 72 year old male with past medical history of HTN, HLD presents to office for initial evaluation of left hip pain x 1 month. Pain located to lateral aspect of hip, does not radiate. Exacerbated by laying on left side in  bed, getting in and out of the car, sitting for long periods of time. Has been NSAIDs for pain with minimal relief. States that he used to walk 3+ miles per day 5 days/week but has stopped over the past month due to his discomfort. Has not been to PT and denies a history of injections or surgeries in the hip. Denies use of assisitve device for ambulation. Denies any recent injuries, falls or trauma, mechanical symptoms of the hip, radiation of pain to back or knee, numbness/tingling, swelling. \par \par Denies history of smoking or drug use, admits to occasional alcohol consumption.  [Hip Movement] : not worsened by hip movement

## 2023-03-24 NOTE — DATA REVIEWED
[MRI] : MRI [Cervical Spine] : cervical spine [Report was reviewed and noted in the chart] : The report was reviewed and noted in the chart [I independently reviewed and interpreted images and report] : I independently reviewed and interpreted images and report [I reviewed the films/CD and additionally noted] : I reviewed the films/CD and additionally noted [FreeTextEntry1] : TAVIA 11/14/22 [facility]- C2-3 normal\par C3-4 moderate DDD mod b/l facet degeneration and a moderate to severe right foraminal stenosis and moderate to severe left foraminal stenosis\par C45 mild ddd and adv left facet degeneration with cyst formation interosseus, mod b/l foraminal stenosis\par C56 mod to adv b/l foraminal stenosis\par c67 mod to adv ddd with mod right and severe left foraminal stenosis\par c7-t1 moder spondylolisthesis

## 2023-03-24 NOTE — ASSESSMENT
[FreeTextEntry1] : 71 y/o M with cervical DDD and disc herniation. Patient will continue with PT and transition to HEP. Patient will follow up with pain mgmt for possible FRITZ, however, he is hesitant to proceed with injection therapy. Discussed possible  C4-7 ACDF if he is refractory to conservative care. Patient will follow up as needed. \par \par Prior to appointment and during encounter with patient extensive medical records were reviewed including but not limited to, hospital records, outpatient records, imaging results, and lab data.During this appointment the patient was examined, diagnoses were discussed and explained in a face to face manner. In addition extensive time was spent reviewing aforementioned diagnostic studies. Counseling including abnormal image results, differential diagnoses, treatment options, risk and benefits, lifestyle changes, current condition, and current medications was performed.Patient's comments, questions, and concerns were addressed and patient verbalized understanding. Based on this patient's presentation at our office, which is an orthopedic spine surgeon's office, this patient inherently / intrinsically has a risk, however minute, of developing issues such as Cauda equina syndrome, bowel and bladder changes, or progression of motor or neurological deficits such as paralysis which may be permanent.\par \par I, Katie Najera, attest that this documentation has been prepared under the direction and in the presence of provider Dr. Sutton.\par

## 2023-03-24 NOTE — PHYSICAL EXAM
[5___] : right grasp 5[unfilled]/5 [Left] : left shoulder [There are no fractures, subluxations or dislocations. No significant abnormalities are seen] : There are no fractures, subluxations or dislocations. No significant abnormalities are seen [de-identified] : A. General\par 1. .vit:\par Constitutional:\par - General Appearance:\par Unremarkable\par Body Habitus\par Well Developed\par Well Nourished\par Body Habitus\par No Deformities\par Well Groomed\par Ability To communicate:\par Normal\par Neurologic:\par Global sensation is intact to upper and lower extremities. See examination of Neck and/or Spine\par for exceptions.\par Orientation to Time, Place and Person is: Normal\par Mood And Affect is Normal\par Skin:\par - Head/Face, Right Upper/Lower Extremity, Left Upper/Lower Extremity: Normal\par See Examination of Neck and/or Spine for exceptions\par Cardiovascular:\par Peripheral Cardiovascular System is Normal\par Palpation of Lymph Nodes:\par Normal Palpation of lymph nodes in: Axilla, Cervical, Inguinal\par Abnormal Palpation of lymph nodes in: None [FreeTextEntry9] : ROM of the neck with crepitus [de-identified] : C5 and 6 para left\par \par C3-6 ACDF [TWNoteComboBox7] : forward flexion 20 degrees [de-identified] : extension 10 degrees [de-identified] : left lateral rotation 60 degrees [TWNoteComboBox6] : right lateral rotation 60 degrees [FreeTextEntry8] : no pain with ROM [] : negative tinel's [de-identified] : negative wallace

## 2023-03-24 NOTE — HISTORY OF PRESENT ILLNESS
[Gradual] : gradual [4] : 4 [7] : 7 [Burning] : burning [Radiating] : radiating [Tingling] : tingling [Frequent] : frequent [Sitting] : sitting [Lying in bed] : lying in bed [Part time] : Work status: part time [de-identified] : 11/23/22: Continues to have pain in the cervical spine as well as down the upper extremity. He presents for MRI review. He has gotten little relief from PT.  \par \par Prev doc:\par 11/2/22: 71 y/o M with cervical spine pain for the past year. No injury/trauma. Patient states he has pain in the left upper extremity that travels into his fingers. Patient states he also experiences tingling/numbness as well a a burning sensation in the arm. Pain is affecting his sleeping. He has had chiro care with mild relief but continues to have pain daily. Has taken OTC NSAIDS with no relief.  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

## 2023-03-29 ENCOUNTER — APPOINTMENT (OUTPATIENT)
Dept: ORTHOPEDIC SURGERY | Facility: CLINIC | Age: 73
End: 2023-03-29

## 2023-07-27 ENCOUNTER — APPOINTMENT (OUTPATIENT)
Dept: ORTHOPEDIC SURGERY | Facility: CLINIC | Age: 73
End: 2023-07-27
Payer: MEDICARE

## 2023-07-27 VITALS
BODY MASS INDEX: 26.6 KG/M2 | SYSTOLIC BLOOD PRESSURE: 148 MMHG | HEIGHT: 71 IN | DIASTOLIC BLOOD PRESSURE: 82 MMHG | HEART RATE: 71 BPM | WEIGHT: 190 LBS

## 2023-07-27 PROCEDURE — 99213 OFFICE O/P EST LOW 20 MIN: CPT | Mod: 25

## 2023-07-27 PROCEDURE — 73564 X-RAY EXAM KNEE 4 OR MORE: CPT | Mod: RT

## 2023-07-27 PROCEDURE — 20610 DRAIN/INJ JOINT/BURSA W/O US: CPT | Mod: RT

## 2023-07-27 NOTE — DISCUSSION/SUMMARY
[de-identified] : 25 minutes was spent reviewing the x-rays as well as discussing with the patient their clinical presentation, diagnosis and providing education.X-rays taken today reviewed with the patient.  He was shown the advanced degenerative changes.  This is most predominant of the lateral compartment.  The patient was offered and consented to a corticosteroid injection.  This was performed without complication.  He is recommended a trial course of meloxicam as well as physical therapy.  Prescriptions were provided.  Instruction education were provided.  The patient is recommended follow-up with a knee specialist here in about 6 weeks for reevaluation.  He will perform activities as tolerated.  He is encouraged to avoid high impact activities when exercising.  All questions answered to the patient's satisfaction.

## 2023-07-27 NOTE — PROCEDURE
[de-identified] : Procedure: Injection of the right knee. \par Indication:  Osteoarthritis. \par Risk and benefits were discussed with the patient. Potential complications include bleeding and infection. Verbal consent was obtained prior to the procedure. \par Chloraprep was used to prep the area. ethyl chloride spray was used as a topical anesthetic. Using sterile technique, the aspiration/injection needle was then directed from a medial aspect was used to inject 5 mL of 1% Lidocaine and 2 mL of 40mg/mL methylprednisolone. A bandage was applied. The patient tolerated the procedure well. \par Complications: none. \par Patient instructed to avoid strenuous activity for 1 day(s). \par Follow-up in the office as needed.

## 2023-07-27 NOTE — PHYSICAL EXAM
[de-identified] : The patient is conversive and in no apparent distress. The patient is alert and oriented to person, place, and time. Affect and mood appear normal. The head is normocephalic and atraumatic. Skin shows normal turgor with no evidence of eczema or psoriasis. No respiratory distress noted. Sensation grossly intact. MUSCULOSKELETAL:   SEE BELOW\par \par Right knee exam demonstrates skin that is clean, dry intact.  No significant surgical scars.  Mild soft tissue swelling.  Moderate effusion.  Normal temperature.  Valgus alignment of -3 degrees.  Passive range of motion is -2 degrees of extension to approximately 115 degrees of flexion.  Mild patellofemoral crepitus with terminal flexion.  Quadriceps and patella tendons are both intact.  No specific knee tenderness to palpation.  Recent soft tissue abrasions of the lower right anterior lower leg noted.  No signs of active infection.  Mild venous stasis.  No ulcerations secondary to venous stasis.  Gait demonstrates a mild antalgic limp. [de-identified] : 4 view right knee x-rays were reviewed.  Significant joint space narrowing of all compartments.  Shoot through x-ray demonstrates bone-on-bone DJD laterally.

## 2023-07-27 NOTE — HISTORY OF PRESENT ILLNESS
[de-identified] : Patient presents today for evaluation of right knee pain.  He had the knee pain for a few months now.  He reports recently trying to jump up onto a boat when the leg buckled.  He went into a deep knee flexion.  He states of increasing pain since that time.  The pain is fairly nonspecific.  He does report of a valgus knee presentation for an extended period of time.  Does report of crepitus.  He does report of swelling at times.  He is not regularly taking anti-inflammatories.  No past knee injections.  No past knee surgery.  No use of a cane or a walker or bracing.\par \par Review of Systems-\par Constitutional: No fever or chills. \par Cardiovascular: No orthopnea or chest pain\par Pulmonary: No shortness of breath. \par GI: No nausea or vomiting or abdominal pain.\par Musculoskeletal: see HPI \par Psychiatric: No anxiety and depression.

## 2023-08-07 ENCOUNTER — APPOINTMENT (OUTPATIENT)
Dept: DERMATOLOGY | Facility: CLINIC | Age: 73
End: 2023-08-07
Payer: MEDICARE

## 2023-08-07 PROCEDURE — 99213 OFFICE O/P EST LOW 20 MIN: CPT

## 2023-08-16 ENCOUNTER — OFFICE (OUTPATIENT)
Dept: URBAN - METROPOLITAN AREA CLINIC 113 | Facility: CLINIC | Age: 73
Setting detail: OPHTHALMOLOGY
End: 2023-08-16
Payer: MEDICARE

## 2023-08-16 DIAGNOSIS — H01.001: ICD-10-CM

## 2023-08-16 DIAGNOSIS — H04.123: ICD-10-CM

## 2023-08-16 DIAGNOSIS — H43.393: ICD-10-CM

## 2023-08-16 DIAGNOSIS — H01.004: ICD-10-CM

## 2023-08-16 PROCEDURE — 92250 FUNDUS PHOTOGRAPHY W/I&R: CPT | Performed by: OPHTHALMOLOGY

## 2023-08-16 PROCEDURE — 92014 COMPRE OPH EXAM EST PT 1/>: CPT | Performed by: OPHTHALMOLOGY

## 2023-08-16 ASSESSMENT — SUPERFICIAL PUNCTATE KERATITIS (SPK)
OD_SPK: T
OS_SPK: T

## 2023-08-16 ASSESSMENT — TONOMETRY
OS_IOP_MMHG: 15
OD_IOP_MMHG: 16

## 2023-08-16 ASSESSMENT — LID EXAM ASSESSMENTS
OD_BLEPHARITIS: T 1+
OS_BLEPHARITIS: T 1+

## 2023-08-16 ASSESSMENT — CONFRONTATIONAL VISUAL FIELD TEST (CVF)
OS_FINDINGS: FULL
OD_FINDINGS: FULL

## 2023-08-17 ASSESSMENT — REFRACTION_MANIFEST
OS_VA1: 20/25
OD_SPHERE: +0.25
OD_VA1: 20/25+
OS_ADD: +2.50
OD_AXIS: 090
OD_CYLINDER: -2.25
OD_VA2: 20/20
OS_SPHERE: -1.00
OD_AXIS: 095
OS_CYLINDER: -3.00
OD_CYLINDER: -0.25
OS_AXIS: 000
OD_VA1: 20/20
OS_VA2: 20/20
OD_VA1: 20/20
OS_AXIS: 090
OD_SPHERE: +0.25
OD_CYLINDER: SPH
OS_SPHERE: PLANO
OD_ADD: +2.50
OS_VA1: 20/20-1
OS_SPHERE: PL
OD_SPHERE: -2.25
OS_VA1: 20/20
OS_CYLINDER: SPH

## 2023-08-17 ASSESSMENT — REFRACTION_CURRENTRX
OD_OVR_VA: 20/
OD_ADD: +2.50
OD_VPRISM_DIRECTION: PROGS
OS_ADD: +2.50
OS_AXIS: 095
OD_AXIS: 108
OS_OVR_VA: 20/
OS_CYLINDER: -2.25
OD_CYLINDER: -2.75
OS_VPRISM_DIRECTION: PROGS
OD_SPHERE: -2.25
OS_SPHERE: -1.50

## 2023-08-17 ASSESSMENT — VISUAL ACUITY
OS_BCVA: 20/20
OD_BCVA: 20/20

## 2023-08-17 ASSESSMENT — AXIALLENGTH_DERIVED
OS_AL: 25.3277
OD_AL: 26.1711
OD_AL: 24.7005
OD_AL: 24.5941

## 2023-08-17 ASSESSMENT — SPHEQUIV_DERIVED
OS_SPHEQUIV: -2.5
OD_SPHEQUIV: -3.375
OD_SPHEQUIV: -0.125
OD_SPHEQUIV: 0.125

## 2023-08-17 ASSESSMENT — KERATOMETRY
OS_AXISANGLE_DEGREES: 119
OS_K2POWER_DIOPTERS: 42.25
METHOD_AUTO_MANUAL: AUTO
OD_K2POWER_DIOPTERS: 41.00
OD_AXISANGLE_DEGREES: 035
OD_K1POWER_DIOPTERS: 40.50
OS_K1POWER_DIOPTERS: 41.25

## 2023-08-17 ASSESSMENT — REFRACTION_AUTOREFRACTION
OS_SPHERE: PLANO
OS_AXIS: 084
OD_AXIS: 104
OD_SPHERE: +0.25
OS_CYLINDER: -0.75
OD_CYLINDER: -0.75

## 2023-09-08 ENCOUNTER — APPOINTMENT (OUTPATIENT)
Dept: ORTHOPEDIC SURGERY | Facility: CLINIC | Age: 73
End: 2023-09-08
Payer: MEDICARE

## 2023-09-08 VITALS
BODY MASS INDEX: 26.6 KG/M2 | WEIGHT: 190 LBS | HEIGHT: 71 IN | HEART RATE: 65 BPM | SYSTOLIC BLOOD PRESSURE: 127 MMHG | TEMPERATURE: 97.1 F | DIASTOLIC BLOOD PRESSURE: 82 MMHG

## 2023-09-08 DIAGNOSIS — M25.561 PAIN IN RIGHT KNEE: ICD-10-CM

## 2023-09-08 PROCEDURE — 20610 DRAIN/INJ JOINT/BURSA W/O US: CPT | Mod: RT

## 2023-09-08 PROCEDURE — 99214 OFFICE O/P EST MOD 30 MIN: CPT | Mod: 25

## 2023-09-08 PROCEDURE — 73562 X-RAY EXAM OF KNEE 3: CPT | Mod: RT

## 2023-09-08 NOTE — DISCUSSION/SUMMARY
[Medication Risks Reviewed] : Medication risks reviewed [Surgical risks reviewed] : Surgical risks reviewed [de-identified] : 72 M pt presents with severe tricompartmental osteoarthritis of the right knee. The nature of his condition and treatment options were discussed in detail. The pt is candidate for a right TKA. The surgery was discussed in detail. The pt is not interested in surgery at this time. The pt would like to exhaust conservative treatment options. The pt opted for a right knee cortisone injection which he tolerated well. The pt will f/u in 3 months for repeat injections if needed.   The patient is a 72 year individual with end stage arthritis of their right knee joint. Based upon the patient's continued symptoms and failure to respond to conservative treatment (including HA injections, cortisone injections, over the counter medications, and PT)I have recommended a right total knee arthroplasty for this patient. A long discussion took place with the patient describing what a total joint replacement is and what the procedure would entail. A total knee arthroplasty model, similar to the implant that was used during the operation, was utilized to demonstrate and to discuss the various bearing surfaces of the implants. The hospitalization and post-operative care and rehabilitation were also discussed. The use of perioperative antibiotics and DVT prophylaxis were discussed. The risk, benefits and alternatives to a surgical intervention were discussed at length with the patient. The patient was also advised of risks related to the medical comorbidities, elevated body mass index (BMI), and smoking where applicable. We discussed how to reduce modifiable risk factors and encouraged smoking cessation were applicable. A lengthy discussion took place to review the most common complications including but not limited to: deep vein thrombosis, pulmonary embolus, heart attack, stroke, infection, wound breakdown, numbness, damage to nerves, tendon, muscles, arteries or other blood vessels, death and other possible complications from anesthesia. The patient was told that we will take steps to minimize these risks by using sterile technique, antibiotics and DVT prophylaxis when appropriate and follow the patient postoperatively in the office setting to monitor progress. The possibility of recurrent pain, no improvement in pain and actual worsening of pain were also discussed with the patient. The discharge plan of care focused on the patient going home following surgery. The patient was encouraged to make the necessary arrangements to have someone stay with them when they are discharged home. Following discharge, a home care nurse was to the patient. The home care nurse would open the patients home care case and request home physical therapy services. Home physical therapy was to commence following discharge provided it was appropriate and covered by the health insurance benefit plan.  The benefits of surgery were discussed with the patient including the potential for improving his current clinical condition through operative intervention. Alternatives to surgical intervention including continued conservative management were also discussed in detail. All questions were answered to the satisfaction of the patient. The treatment plan of care, as well as a model of a total knee arthroplasty equivalent to the one that will be used for their total joint replacement, was shared with the patient. The patient agreed to the plan of care as well as the use of implants in their total joint replacement.

## 2023-09-08 NOTE — HISTORY OF PRESENT ILLNESS
[de-identified] : 71 y/o M pt presents with right knee pain. The pt has been having pain for many years. The pt has a hx of a right knee scope in 1990  He had worsening right knee pain for the past few months. The pt tried to jump up onto a boat when the leg buckled. He went into a deep knee flexion. He states of increasing pain since that time. The pain is fairly nonspecific. He does report of a valgus knee presentation for an extended period of time. Does report of crepitus. He does report of swelling at times. The pt was seen by Dr. Forbes and Niko KANG and recieved a cortisone injection which was helpful. The pt would like to have a repeat injection of the right knee before his trip to [place]. He takes Mobic which helps his pain.  [Worsening] : worsening [8] : a current pain level of 8/10 [4] : a minimum pain level of 4/10 [10] : a maximum pain level of 10/10 [Walking] : worsened by walking [Rest] : relieved by rest

## 2023-09-08 NOTE — PROCEDURE
[de-identified] : I injected the patient's right knee today with cortisone for primary osteoarthritis.  I discussed at length with the patient the planned steroid and lidocaine injection. The risks, benefits, convalescence and alternatives were reviewed. The possible side effects discussed included but were not limited to: pain, swelling, heat, bleeding, and redness. Symptoms are generally mild but if they are extensive then contact the office. Giving pain relievers by mouth such as NSAIDs or Tylenol can generally treat the reactions to steroid and lidocaine. Rare cases of infection have been noted. Rash, hives and itching may occur post injection. If you have muscle pain or cramps, flushing and or swelling of the face, rapid heart beat, nausea, dizziness, fever, chills, headache, difficulty breathing, swelling in the arms or legs, or have a prickly feeling of your skin, contact a health care provider immediately. Following this discussion, the knee was prepped with Alcohol and under sterile condition the 80 mg Depo-Medrol and 6 cc Lidocaine injection was performed with a 20 gauge needle through a superolateral injection site. The needle was introduced into the joint, aspiration was performed to ensure intra-articular placement and the medication was injected. Upon withdrawal of the needle the site was cleaned with alcohol and a band aid applied. The patient tolerated the injection well and there were no adverse effects. Post injection instructions included no strenuous activity for 24 hours, cryotherapy and if there are any adverse effects to contact the office.

## 2023-09-08 NOTE — PHYSICAL EXAM
[LE] : Sensory: Intact in bilateral lower extremities [ALL] : dorsalis pedis, posterior tibial, femoral, popliteal, and radial 2+ and symmetric bilaterally [Normal] : Alert and in no acute distress [Poor Appearance] : well-appearing [de-identified] : GENERAL APPEARANCE: Well nourished and hydrated, pleasant, alert, and oriented x 3. Appears their stated age.  HEENT: Normocephalic, extraocular eye motion intact. Nasal septum midline. Oral cavity clear. External auditory canal clear.  RESPIRATORY: Breath sounds clear and audible in all lobes. No wheezing, No accessory muscle use. CARDIOVASCULAR: No apparent abnormalities. No lower leg edema. No varicosities. Pedal pulses are palpable. NEUROLOGIC: Sensation is normal, no muscle weakness in the upper or lower extremities. DERMATOLOGIC: No apparent skin lesions, moist, warm, no rash. SPINE: Cervical spine appears normal and moves freely; thoracic spine appears normal and moves freely; lumbosacral spine appears normal and moves freely, normal, nontender. MUSCULOSKELETAL: Hands, wrists, and elbows are normal and move freely, shoulders are normal and move freely.  [de-identified] : Right knee exam shows medial jointline tenderness ROM 0-110 [de-identified] : Outside 3 view Xray of the right knee shows severe tricompartmental osteoarthritis

## 2023-09-08 NOTE — END OF VISIT
[FreeTextEntry3] : I, Petey Leach, acted solely as a scribe for Dr. Kristopher Banegas on 09/08/2023

## 2023-12-01 ENCOUNTER — APPOINTMENT (OUTPATIENT)
Dept: ORTHOPEDIC SURGERY | Facility: CLINIC | Age: 73
End: 2023-12-01
Payer: MEDICARE

## 2023-12-01 VITALS
SYSTOLIC BLOOD PRESSURE: 116 MMHG | WEIGHT: 187 LBS | DIASTOLIC BLOOD PRESSURE: 75 MMHG | HEART RATE: 70 BPM | HEIGHT: 71 IN | BODY MASS INDEX: 26.18 KG/M2

## 2023-12-01 PROCEDURE — 99213 OFFICE O/P EST LOW 20 MIN: CPT | Mod: 25

## 2023-12-01 PROCEDURE — 20610 DRAIN/INJ JOINT/BURSA W/O US: CPT | Mod: RT

## 2024-01-31 ENCOUNTER — OFFICE (OUTPATIENT)
Dept: URBAN - METROPOLITAN AREA CLINIC 113 | Facility: CLINIC | Age: 74
Setting detail: OPHTHALMOLOGY
End: 2024-01-31
Payer: MEDICARE

## 2024-01-31 DIAGNOSIS — H34.8320: ICD-10-CM

## 2024-01-31 DIAGNOSIS — H01.004: ICD-10-CM

## 2024-01-31 DIAGNOSIS — H43.393: ICD-10-CM

## 2024-01-31 DIAGNOSIS — H35.033: ICD-10-CM

## 2024-01-31 DIAGNOSIS — H01.001: ICD-10-CM

## 2024-01-31 DIAGNOSIS — H04.123: ICD-10-CM

## 2024-01-31 PROCEDURE — 92014 COMPRE OPH EXAM EST PT 1/>: CPT | Performed by: STUDENT IN AN ORGANIZED HEALTH CARE EDUCATION/TRAINING PROGRAM

## 2024-01-31 PROCEDURE — 92134 CPTRZ OPH DX IMG PST SGM RTA: CPT | Performed by: STUDENT IN AN ORGANIZED HEALTH CARE EDUCATION/TRAINING PROGRAM

## 2024-01-31 ASSESSMENT — REFRACTION_MANIFEST
OD_AXIS: 095
OS_SPHERE: -1.00
OD_AXIS: 090
OD_VA2: 20/20
OS_VA1: 20/25
OD_VA1: 20/20
OS_SPHERE: PLANO
OS_CYLINDER: -3.00
OS_VA1: 20/20
OD_VA1: 20/25+
OS_SPHERE: PL
OS_VA2: 20/20
OD_CYLINDER: -2.25
OS_CYLINDER: SPH
OS_AXIS: 090
OS_AXIS: 000
OD_ADD: +2.50
OD_SPHERE: -2.25
OD_CYLINDER: SPH
OD_VA1: 20/20
OS_ADD: +2.50
OD_CYLINDER: -0.25
OD_SPHERE: +0.25
OS_VA1: 20/20-1
OD_SPHERE: +0.25

## 2024-01-31 ASSESSMENT — LID EXAM ASSESSMENTS
OS_BLEPHARITIS: T 1+
OD_BLEPHARITIS: T 1+

## 2024-01-31 ASSESSMENT — REFRACTION_CURRENTRX
OS_ADD: +2.50
OD_OVR_VA: 20/
OD_AXIS: 108
OD_VPRISM_DIRECTION: PROGS
OS_OVR_VA: 20/
OS_AXIS: 095
OS_SPHERE: -1.50
OS_CYLINDER: -2.25
OS_VPRISM_DIRECTION: PROGS
OD_SPHERE: -2.25
OD_ADD: +2.50
OD_CYLINDER: -2.75

## 2024-01-31 ASSESSMENT — CONFRONTATIONAL VISUAL FIELD TEST (CVF)
OS_FINDINGS: FULL
OD_FINDINGS: FULL

## 2024-01-31 ASSESSMENT — SPHEQUIV_DERIVED
OD_SPHEQUIV: 0.125
OD_SPHEQUIV: -3.375
OS_SPHEQUIV: -2.5

## 2024-02-01 ENCOUNTER — OFFICE (OUTPATIENT)
Dept: URBAN - METROPOLITAN AREA CLINIC 115 | Facility: CLINIC | Age: 74
Setting detail: OPHTHALMOLOGY
End: 2024-02-01
Payer: MEDICARE

## 2024-02-01 DIAGNOSIS — H43.393: ICD-10-CM

## 2024-02-01 DIAGNOSIS — H35.033: ICD-10-CM

## 2024-02-01 DIAGNOSIS — H34.8320: ICD-10-CM

## 2024-02-01 PROCEDURE — 92134 CPTRZ OPH DX IMG PST SGM RTA: CPT | Performed by: OPHTHALMOLOGY

## 2024-02-01 PROCEDURE — 67028 INJECTION EYE DRUG: CPT | Mod: LT | Performed by: OPHTHALMOLOGY

## 2024-02-01 PROCEDURE — 92012 INTRM OPH EXAM EST PATIENT: CPT | Mod: 25 | Performed by: OPHTHALMOLOGY

## 2024-02-01 ASSESSMENT — REFRACTION_CURRENTRX
OS_AXIS: 095
OS_SPHERE: -1.50
OD_OVR_VA: 20/
OD_AXIS: 108
OS_CYLINDER: -2.25
OD_SPHERE: -2.25
OD_CYLINDER: -2.75
OS_VPRISM_DIRECTION: PROGS
OS_ADD: +2.50
OS_OVR_VA: 20/
OD_VPRISM_DIRECTION: PROGS
OD_ADD: +2.50

## 2024-02-01 ASSESSMENT — LID EXAM ASSESSMENTS
OS_BLEPHARITIS: T 1+
OD_BLEPHARITIS: T 1+

## 2024-02-01 ASSESSMENT — REFRACTION_AUTOREFRACTION
OD_AXIS: 104
OS_CYLINDER: -0.75
OD_SPHERE: +0.25
OD_CYLINDER: -0.75
OS_AXIS: 084
OS_SPHERE: PLANO

## 2024-02-01 ASSESSMENT — SUPERFICIAL PUNCTATE KERATITIS (SPK)
OD_SPK: T
OS_SPK: T

## 2024-02-01 ASSESSMENT — CONFRONTATIONAL VISUAL FIELD TEST (CVF)
OS_FINDINGS: FULL
OD_FINDINGS: FULL

## 2024-02-01 ASSESSMENT — SPHEQUIV_DERIVED: OD_SPHEQUIV: -0.125

## 2024-03-04 ENCOUNTER — OFFICE (OUTPATIENT)
Dept: URBAN - METROPOLITAN AREA CLINIC 115 | Facility: CLINIC | Age: 74
Setting detail: OPHTHALMOLOGY
End: 2024-03-04
Payer: MEDICARE

## 2024-03-04 DIAGNOSIS — H35.033: ICD-10-CM

## 2024-03-04 DIAGNOSIS — H34.8320: ICD-10-CM

## 2024-03-04 DIAGNOSIS — H43.393: ICD-10-CM

## 2024-03-04 PROCEDURE — 92134 CPTRZ OPH DX IMG PST SGM RTA: CPT | Performed by: OPHTHALMOLOGY

## 2024-03-04 PROCEDURE — 92012 INTRM OPH EXAM EST PATIENT: CPT | Performed by: OPHTHALMOLOGY

## 2024-03-04 ASSESSMENT — LID EXAM ASSESSMENTS
OS_BLEPHARITIS: T 1+
OD_BLEPHARITIS: T 1+

## 2024-03-04 ASSESSMENT — REFRACTION_CURRENTRX
OS_ADD: +2.50
OS_SPHERE: -1.50
OD_SPHERE: -2.25
OD_CYLINDER: -2.75
OS_VPRISM_DIRECTION: PROGS
OS_AXIS: 095
OD_ADD: +2.50
OD_VPRISM_DIRECTION: PROGS
OS_CYLINDER: -2.25
OD_OVR_VA: 20/
OS_OVR_VA: 20/
OD_AXIS: 108

## 2024-03-07 ENCOUNTER — APPOINTMENT (OUTPATIENT)
Dept: ORTHOPEDIC SURGERY | Facility: CLINIC | Age: 74
End: 2024-03-07
Payer: MEDICARE

## 2024-03-07 VITALS
HEART RATE: 64 BPM | DIASTOLIC BLOOD PRESSURE: 87 MMHG | HEIGHT: 71 IN | WEIGHT: 187 LBS | SYSTOLIC BLOOD PRESSURE: 130 MMHG | BODY MASS INDEX: 26.18 KG/M2

## 2024-03-07 PROCEDURE — 99213 OFFICE O/P EST LOW 20 MIN: CPT | Mod: 25

## 2024-03-07 PROCEDURE — 20610 DRAIN/INJ JOINT/BURSA W/O US: CPT | Mod: RT

## 2024-03-07 NOTE — DISCUSSION/SUMMARY
[Medication Risks Reviewed] : Medication risks reviewed [Surgical risks reviewed] : Surgical risks reviewed [de-identified] : 72 M pt presents with severe tricompartmental osteoarthritis of the right knee. The nature of his condition and treatment options were discussed in detail. The pt is candidate for a right TKA. The surgery was discussed in detail. The pt is not interested in surgery at this time.  Treatment options were reviewed.  patient had an aspiration of 15cc clear synovial fluid followed by cortisone injection in the office today.  Patient will follow-up in 3 months for reevaluation.  All questions answered.

## 2024-03-07 NOTE — PHYSICAL EXAM
[LE] : Sensory: Intact in bilateral lower extremities [ALL] : dorsalis pedis, posterior tibial, femoral, popliteal, and radial 2+ and symmetric bilaterally [Normal] : Alert and in no acute distress [Poor Appearance] : well-appearing [de-identified] : GENERAL APPEARANCE: Well nourished and hydrated, pleasant, alert, and oriented x 3. Appears their stated age.  HEENT: Normocephalic, extraocular eye motion intact. Nasal septum midline. Oral cavity clear. External auditory canal clear.  RESPIRATORY: Breath sounds clear and audible in all lobes. No wheezing, No accessory muscle use. CARDIOVASCULAR: No apparent abnormalities. No lower leg edema. No varicosities. Pedal pulses are palpable. NEUROLOGIC: Sensation is normal, no muscle weakness in the upper or lower extremities. DERMATOLOGIC: No apparent skin lesions, moist, warm, no rash. SPINE: Cervical spine appears normal and moves freely; thoracic spine appears normal and moves freely; lumbosacral spine appears normal and moves freely, normal, nontender. MUSCULOSKELETAL: Hands, wrists, and elbows are normal and move freely, shoulders are normal and move freely.  [de-identified] : Right knee exam shows medial joint line tenderness ROM 0-110 degrees, no instability, 5 out of 5 strength. moderate effusion [de-identified] : Outside 3 view Xray of the right knee shows severe tricompartmental osteoarthritis

## 2024-03-07 NOTE — HISTORY OF PRESENT ILLNESS
[de-identified] : 71 y/o M pt presents for follow-up evaluation of right knee pain. The pt has been having pain for many years. The pt has a hx of a right knee scope in 1990.  He reports 3 months of improvement following a previous cortisone injection given at his last office visit.  Nhe does note he has had episodes of increased swelling.  He did take meloxicam about a week ago when his knee felt particularly swollen and he does still feel he is swollen today.o recent injury.  He reports intermittent, mild to moderate diffuse dull aching pain about the knee.  Exacerbated with prolonged standing, walking and associated with general stiffness.  No catching, locking or buckling. [Worsening] : worsening [8] : a current pain level of 8/10 [10] : a maximum pain level of 10/10 [4] : a minimum pain level of 4/10 [Rest] : relieved by rest [Walking] : worsened by walking

## 2024-03-07 NOTE — PROCEDURE
[de-identified] : I iaspirated and injected the RIGHT knee.  I discussed at length with the patient the planned steroid and lidocaine injection. The risks, benefits, convalescence and alternatives were reviewed. The possible side effects discussed included but were not limited to: pain, swelling, heat, bleeding, and redness. Symptoms are generally mild but if they are extensive then contact the office. Giving pain relievers by mouth such as NSAIDs or Tylenol can generally treat the reactions to steroid and lidocaine. Rare cases of infection have been noted. Rash, hives and itching may occur post injection. If you have muscle pain or cramps, flushing and or swelling of the face, rapid heart beat, nausea, dizziness, fever, chills, headache, difficulty breathing, swelling in the arms or legs, or have a prickly feeling of your skin, contact a health care provider immediately. Following this discussion, the knee was prepped with Alcohol and under sterile condition the 80 mg Depo-Medrol and 6 cc Lidocaine injection was performed with a 20 gauge needle through a infralateral injection site. The needle was introduced into the joint, 15cc of clear synovial fluid were aspirated. Syringe was changed and injection was performed. d. Upon withdrawal of the needle the site was cleaned with alcohol and a band aid applied. The patient tolerated the injection well and there were no adverse effects. Post injection instructions included no strenuous activity for 24 hours, cryotherapy and if there are any adverse effects to contact the office.

## 2024-05-06 ENCOUNTER — OFFICE (OUTPATIENT)
Dept: URBAN - METROPOLITAN AREA CLINIC 115 | Facility: CLINIC | Age: 74
Setting detail: OPHTHALMOLOGY
End: 2024-05-06
Payer: MEDICARE

## 2024-05-06 DIAGNOSIS — H35.033: ICD-10-CM

## 2024-05-06 DIAGNOSIS — H34.8320: ICD-10-CM

## 2024-05-06 PROCEDURE — 92134 CPTRZ OPH DX IMG PST SGM RTA: CPT | Performed by: OPHTHALMOLOGY

## 2024-05-06 PROCEDURE — 92202 OPSCPY EXTND ON/MAC DRAW: CPT | Performed by: OPHTHALMOLOGY

## 2024-05-06 PROCEDURE — 67028 INJECTION EYE DRUG: CPT | Mod: LT | Performed by: OPHTHALMOLOGY

## 2024-05-06 ASSESSMENT — CONFRONTATIONAL VISUAL FIELD TEST (CVF)
OD_FINDINGS: FULL
OS_FINDINGS: FULL

## 2024-05-06 ASSESSMENT — LID EXAM ASSESSMENTS
OS_BLEPHARITIS: T 1+
OD_BLEPHARITIS: T 1+

## 2024-05-07 ENCOUNTER — OFFICE (OUTPATIENT)
Dept: URBAN - METROPOLITAN AREA CLINIC 88 | Facility: CLINIC | Age: 74
Setting detail: OPHTHALMOLOGY
End: 2024-05-07
Payer: MEDICARE

## 2024-05-07 DIAGNOSIS — H35.033: ICD-10-CM

## 2024-05-07 DIAGNOSIS — S05.02XA: ICD-10-CM

## 2024-05-07 DIAGNOSIS — H43.393: ICD-10-CM

## 2024-05-07 PROCEDURE — 92134 CPTRZ OPH DX IMG PST SGM RTA: CPT | Performed by: OPHTHALMOLOGY

## 2024-05-07 PROCEDURE — 99214 OFFICE O/P EST MOD 30 MIN: CPT | Performed by: OPHTHALMOLOGY

## 2024-05-07 ASSESSMENT — CONFRONTATIONAL VISUAL FIELD TEST (CVF)
OD_FINDINGS: FULL
OS_FINDINGS: FULL

## 2024-05-07 ASSESSMENT — LID EXAM ASSESSMENTS
OD_BLEPHARITIS: T 1+
OS_BLEPHARITIS: T 1+

## 2024-05-09 ENCOUNTER — OFFICE (OUTPATIENT)
Dept: URBAN - METROPOLITAN AREA CLINIC 115 | Facility: CLINIC | Age: 74
Setting detail: OPHTHALMOLOGY
End: 2024-05-09
Payer: MEDICARE

## 2024-05-09 DIAGNOSIS — S05.02XA: ICD-10-CM

## 2024-05-09 DIAGNOSIS — H35.033: ICD-10-CM

## 2024-05-09 DIAGNOSIS — H43.393: ICD-10-CM

## 2024-05-09 PROCEDURE — 92012 INTRM OPH EXAM EST PATIENT: CPT | Performed by: OPHTHALMOLOGY

## 2024-05-09 PROCEDURE — 92134 CPTRZ OPH DX IMG PST SGM RTA: CPT | Performed by: OPHTHALMOLOGY

## 2024-05-09 ASSESSMENT — LID EXAM ASSESSMENTS
OS_BLEPHARITIS: T 1+
OD_BLEPHARITIS: T 1+

## 2024-05-09 ASSESSMENT — CONFRONTATIONAL VISUAL FIELD TEST (CVF)
OS_FINDINGS: FULL
OD_FINDINGS: FULL

## 2024-06-13 ENCOUNTER — APPOINTMENT (OUTPATIENT)
Age: 74
End: 2024-06-13
Payer: MEDICARE

## 2024-06-13 VITALS
WEIGHT: 190 LBS | BODY MASS INDEX: 26.6 KG/M2 | HEART RATE: 67 BPM | DIASTOLIC BLOOD PRESSURE: 84 MMHG | SYSTOLIC BLOOD PRESSURE: 148 MMHG | HEIGHT: 71 IN

## 2024-06-13 DIAGNOSIS — M17.11 UNILATERAL PRIMARY OSTEOARTHRITIS, RIGHT KNEE: ICD-10-CM

## 2024-06-13 PROCEDURE — 99213 OFFICE O/P EST LOW 20 MIN: CPT | Mod: 25

## 2024-06-13 PROCEDURE — 20610 DRAIN/INJ JOINT/BURSA W/O US: CPT | Mod: RT

## 2024-06-13 NOTE — REASON FOR VISIT
[Other: ____] : [unfilled] [Initial Visit] : an initial visit for [Knee Pain] : knee pain [FreeTextEntry2] : Right knee pain

## 2024-06-13 NOTE — PHYSICAL EXAM
[LE] : Sensory: Intact in bilateral lower extremities [ALL] : dorsalis pedis, posterior tibial, femoral, popliteal, and radial 2+ and symmetric bilaterally [Normal] : Alert and in no acute distress [Poor Appearance] : well-appearing [de-identified] : GENERAL APPEARANCE: Well nourished and hydrated, pleasant, alert, and oriented x 3. Appears their stated age.  HEENT: Normocephalic, extraocular eye motion intact. Nasal septum midline. Oral cavity clear. External auditory canal clear.  RESPIRATORY: Breath sounds clear and audible in all lobes. No wheezing, No accessory muscle use. CARDIOVASCULAR: No apparent abnormalities. No lower leg edema. No varicosities. Pedal pulses are palpable. NEUROLOGIC: Sensation is normal, no muscle weakness in the upper or lower extremities. DERMATOLOGIC: No apparent skin lesions, moist, warm, no rash. SPINE: Cervical spine appears normal and moves freely; thoracic spine appears normal and moves freely; lumbosacral spine appears normal and moves freely, normal, nontender. MUSCULOSKELETAL: Hands, wrists, and elbows are normal and move freely, shoulders are normal and move freely.  [de-identified] : Right knee exam shows medial joint line tenderness ROM 0-110 degrees, no instability, 5 out of 5 strength. moderate effusion [de-identified] : Outside 3 view Xray of the right knee shows severe tricompartmental osteoarthritis

## 2024-06-13 NOTE — HISTORY OF PRESENT ILLNESS
[Worsening] : worsening [8] : a current pain level of 8/10 [4] : a minimum pain level of 4/10 [10] : a maximum pain level of 10/10 [Walking] : worsened by walking [Rest] : relieved by rest [de-identified] : 74 y/o M pt presents for follow-up evaluation of right knee pain. The pt has been having pain for many years. The pt has a hx of a right knee scope in 1990.  He has had numerous cortisone injections and aspirations and does take Mobic for pain with little relief.  He feels as if the right knee is unstable and that if it is going to give out on him.  He did have a cortisone injection at his last visit. He does feel the knee is again swollen.  He did have an aspiration at his last visit which she reports did help

## 2024-06-13 NOTE — DISCUSSION/SUMMARY
[Medication Risks Reviewed] : Medication risks reviewed [Surgical risks reviewed] : Surgical risks reviewed [de-identified] : 72 M pt presents with severe tricompartmental osteoarthritis of the right knee. The nature of his condition and treatment options were discussed in detail. The pt is candidate for a right TKA. He has history of bone-on-bone osteoarthritis of the right knee and has had more than 3 months of conservative management including cortisone injections physical therapy and anti-inflammatory medication such as meloxicam. Patient opted for repeat right knee cortisone injection in the office today.  He will follow-up In a month to discuss total knee replacement with Dr. Banegas and have new XR.    The patient has end stage arthritis of theirright knee joint. The patient has exhausted a minimum of 3 months conservative treatment including prior injections (cortisone and/or hyaluronic acid injections), physical therapy, over the counter NSAIDS and pain medication where indicated. In addition, the patient's quality of life is diminished due to significant chronic pain. The patient is having difficulty with activities of daily living, including ambulating, descending stairs, and rising from a seated position. Based upon the patients continued symptoms and failure to respond to conservative treatment, I have recommended a right total knee replacement for this patient. A long discussion took place with the patient describing what a total joint replacement is and what the procedure would entail. A knee model, similar to the implant that will be used during the operation, was utilized to demonstrate and to discuss the various bearing surfaces of the implants. Implant fixation, use of cement, was also discussed with the patient. Choices of implant manufacturers, mainly [default value], were discussed and reviewed with preference to be made by patient and surgeon prior to operation. Final selection to be based on customary practice as well as preoperative templating with selection confirmed intraoperatively based on the patient's anatomy. The patient participated and agreed with the decision-making process. The hospitalization and post-operative care and rehabilitation were also discussed. The use of perioperative antibiotics and DVT prophylaxis were discussed. The risk, benefits and alternatives to a surgical intervention were discussed at length with the patient. The patient was also advised of risks related to the medical comorbidities and elevated body mass index (BMI). A lengthy discussion took place to review the most common complications including but not limited to: deep vein thrombosis, pulmonary embolism, heart attack, stroke, infection, wound breakdown, numbness, damage to nerves, tendon, muscles, arteries or other blood vessels, death and other possible complications from anesthesia. The patient was told that we will take steps to minimize these risks by using sterile technique, antibiotics and DVT prophylaxis when appropriate and follow the patient postoperatively in the office setting to monitor progress. The possibility of recurrent pain, no improvement in pain and actual worsening of pain were also discussed with the patient. The discharge plan of care focused on the patient going home following surgery. The patient was encouraged to make the necessary arrangements to have someone stay with them when they are discharged home. Following discharge, a home care nurse will visit the patient. The home care nurse will open your home care case and request home physical therapy services. Home physical therapy will commence following discharge provided it is appropriate and covered by the health insurance benefit plan. The benefits of surgery were discussed with the patient including the potential for improving his/her current clinical condition through operative intervention. Alternatives to surgical intervention including continued conservative management were also discussed in detail. All questions were answered to the satisfaction of the patient. The treatment plan of care, as well as a model of a total knee equivalent to the one that will be used for their total joint replacement, was shared with the patient. The patient participated and agreed to the plan of care as

## 2024-06-13 NOTE — PROCEDURE
[de-identified] : I iaspirated 25ccs of clear synovial fluid from the right knee followed by injection.   I discussed at length with the patient the planned steroid and lidocaine injection. The risks, benefits, convalescence and alternatives were reviewed. The possible side effects discussed included but were not limited to: pain, swelling, heat, bleeding, and redness. Symptoms are generally mild but if they are extensive then contact the office. Giving pain relievers by mouth such as NSAIDs or Tylenol can generally treat the reactions to steroid and lidocaine. Rare cases of infection have been noted. Rash, hives and itching may occur post injection. If you have muscle pain or cramps, flushing and or swelling of the face, rapid heart beat, nausea, dizziness, fever, chills, headache, difficulty breathing, swelling in the arms or legs, or have a prickly feeling of your skin, contact a health care provider immediately. Following this discussion, the knee was prepped with Alcohol and under sterile condition the 80 mg Depo-Medrol and 6 cc Lidocaine injection was performed with a 20 gauge needle through a infralateral injection site. The needle was introduced into the joint, 15cc of clear synovial fluid were aspirated. Syringe was changed and injection was performed. d. Upon withdrawal of the needle the site was cleaned with alcohol and a band aid applied. The patient tolerated the injection well and there were no adverse effects. Post injection instructions included no strenuous activity for 24 hours, cryotherapy and if there are any adverse effects to contact the office.

## 2024-06-14 RX ORDER — MELOXICAM 15 MG/1
15 TABLET ORAL
Qty: 30 | Refills: 0 | Status: ACTIVE | COMMUNITY
Start: 2023-07-27 | End: 1900-01-01

## 2024-07-02 ENCOUNTER — NON-APPOINTMENT (OUTPATIENT)
Age: 74
End: 2024-07-02

## 2024-07-03 ENCOUNTER — APPOINTMENT (OUTPATIENT)
Dept: ORTHOPEDIC SURGERY | Facility: CLINIC | Age: 74
End: 2024-07-03
Payer: MEDICARE

## 2024-07-03 VITALS
BODY MASS INDEX: 26.18 KG/M2 | SYSTOLIC BLOOD PRESSURE: 138 MMHG | WEIGHT: 187 LBS | HEART RATE: 71 BPM | DIASTOLIC BLOOD PRESSURE: 88 MMHG | HEIGHT: 71 IN

## 2024-07-03 PROCEDURE — 73564 X-RAY EXAM KNEE 4 OR MORE: CPT | Mod: 50

## 2024-07-03 PROCEDURE — 99214 OFFICE O/P EST MOD 30 MIN: CPT

## 2024-08-07 ENCOUNTER — APPOINTMENT (OUTPATIENT)
Dept: DERMATOLOGY | Facility: CLINIC | Age: 74
End: 2024-08-07

## 2024-08-07 PROCEDURE — 11102 TANGNTL BX SKIN SINGLE LES: CPT

## 2024-08-07 PROCEDURE — 99213 OFFICE O/P EST LOW 20 MIN: CPT | Mod: 25

## 2024-08-13 ENCOUNTER — OFFICE (OUTPATIENT)
Dept: URBAN - METROPOLITAN AREA CLINIC 115 | Facility: CLINIC | Age: 74
Setting detail: OPHTHALMOLOGY
End: 2024-08-13
Payer: MEDICARE

## 2024-08-13 DIAGNOSIS — H34.8320: ICD-10-CM

## 2024-08-13 DIAGNOSIS — H43.393: ICD-10-CM

## 2024-08-13 DIAGNOSIS — H35.033: ICD-10-CM

## 2024-08-13 DIAGNOSIS — S05.02XA: ICD-10-CM

## 2024-08-13 PROCEDURE — 92014 COMPRE OPH EXAM EST PT 1/>: CPT | Performed by: OPHTHALMOLOGY

## 2024-08-13 PROCEDURE — 92134 CPTRZ OPH DX IMG PST SGM RTA: CPT | Performed by: OPHTHALMOLOGY

## 2024-08-13 ASSESSMENT — LID EXAM ASSESSMENTS
OD_BLEPHARITIS: T 1+
OS_BLEPHARITIS: T 1+

## 2024-08-13 ASSESSMENT — CONFRONTATIONAL VISUAL FIELD TEST (CVF)
OS_FINDINGS: FULL
OD_FINDINGS: FULL

## 2024-08-27 NOTE — DISCUSSION/SUMMARY
Please call the patient thank you [Medication Risks Reviewed] : Medication risks reviewed [Surgical risks reviewed] : Surgical risks reviewed [de-identified] : Patient is a 72-year-old male with left greater trochanteric bursitis presenting today for initial evaluation.  He is not yet tried conservative treatment and I think that is warranted at this time.  I ordered an ultrasound-guided cortisone injection into his left greater trochanteric bursa.  I recommended physical therapy.  I given prescription for diclofenac gel.  I will see him back on an as-needed basis for his left hip.  All questions were asked and answered.

## 2024-09-17 ENCOUNTER — APPOINTMENT (OUTPATIENT)
Dept: ORTHOPEDIC SURGERY | Facility: CLINIC | Age: 74
End: 2024-09-17
Payer: MEDICARE

## 2024-09-17 VITALS
DIASTOLIC BLOOD PRESSURE: 83 MMHG | WEIGHT: 187 LBS | BODY MASS INDEX: 26.18 KG/M2 | SYSTOLIC BLOOD PRESSURE: 133 MMHG | HEART RATE: 67 BPM | HEIGHT: 71 IN

## 2024-09-17 DIAGNOSIS — M17.11 UNILATERAL PRIMARY OSTEOARTHRITIS, RIGHT KNEE: ICD-10-CM

## 2024-09-17 DIAGNOSIS — M25.461 EFFUSION, RIGHT KNEE: ICD-10-CM

## 2024-09-17 PROCEDURE — 20610 DRAIN/INJ JOINT/BURSA W/O US: CPT | Mod: RT

## 2024-09-17 PROCEDURE — 99213 OFFICE O/P EST LOW 20 MIN: CPT | Mod: 25

## 2024-09-17 NOTE — PROCEDURE
[de-identified] : Patient received right knee 80mg cortisone injection after aspiration of the yellow clear synovial fluid of 25 cc for osteoarthritis  I discussed at length with the patient the planned steroid and lidocaine injection. The risks, benefits, convalescence and alternatives were reviewed. The possible side effects discussed included but were not limited to: pain, swelling, heat, bleeding, and redness. Symptoms are generally mild but if they are extensive then contact the office. Giving pain relievers by mouth such as NSAIDs or Tylenol can generally treat the reactions to steroid and lidocaine. Rare cases of infection have been noted. Rash, hives and itching may occur post injection. If you have muscle pain or cramps, flushing and or swelling of the face, rapid heart beat, nausea, dizziness, fever, chills, headache, difficulty breathing, swelling in the arms or legs, or have a prickly feeling of your skin, contact a health care provider immediately. Following this discussion, the knee was prepped with Alcohol and under sterile condition the 80 mg Depo-Medrol and 6 cc Lidocaine injection was performed with a 20 gauge needle through a superolateral injection site. The needle was introduced into the joint, aspiration was performed to ensure intra-articular placement and the medication was injected. Upon withdrawal of the needle the site was cleaned with alcohol and a band aid applied. The patient tolerated the injection well and there were no adverse effects. Post injection instructions included no strenuous activity for 24 hours, cryotherapy and if there are any adverse effects to contact the office.

## 2024-09-17 NOTE — PHYSICAL EXAM
[de-identified] : GENERAL APPEARANCE: Well nourished and hydrated, pleasant, alert, and oriented x 3. Appears their stated age. HEENT: Normocephalic, extraocular eye motion intact. Nasal septum midline. Oral cavity clear. External auditory canal clear. RESPIRATORY: Breath sounds clear and audible in all lobes. No wheezing, No accessory muscle use. CARDIOVASCULAR: No apparent abnormalities. No lower leg edema. No varicosities. Pedal pulses are palpable. NEUROLOGIC: Sensation is normal, no muscle weakness in the upper or lower extremities. DERMATOLOGIC: No apparent skin lesions, moist, warm, no rash. SPINE: Cervical spine appears normal and moves freely; thoracic spine appears normal and moves freely; lumbosacral spine appears normal and moves freely, normal, nontender. MUSCULOSKELETAL: Hands, wrists, and elbows are normal and move freely, shoulders are normal and move freely.   Musculoskeletal:. Right knee exam shows medial joint line tenderness ROM 0-110 degrees, no instability, 5 out of 5 strength. moderate effusion. 5/5 motor strength in bilateral lower extremities. Sensory: Intact in bilateral lower extremities. DTRs: Biceps, brachioradialis, triceps, patellar, ankle and plantar 2+ and symmetric bilaterally. Pulses: dorsalis pedis, posterior tibial, femoral, popliteal, and radial 2+ and symmetric bilaterally.   Constitutional: Alert and in no acute distress, but well-appearing.

## 2024-09-17 NOTE — HISTORY OF PRESENT ILLNESS
[Pain Location] : pain [Stable] : stable [___ yrs] : [unfilled] year(s) ago [5] : a current pain level of 5/10 [de-identified] : 74 y/o M pt presents for follow-up evaluation of right knee pain. The pt has been having pain for many years. The pt has a hx of a right knee scope in 1990. He has had numerous cortisone injections and aspirations and has good relief. He did have a cortisone injection at his last visit. Pt is not considering surgery at this point since his pain is in well controlled by the injections and aspirations.     He states the symptoms are worsening. Pain levels include a current pain level of 5/10, a minimum pain level of 2/10 and a maximum pain level of 8/10.

## 2024-09-17 NOTE — DISCUSSION/SUMMARY
[de-identified] : Medication risks reviewed. Surgical risks reviewed. 72 y/o M pt presents with bone on bone tricompartmental osteoarthritis of the right knee knee. The nature of his condition and treatment options were discussed in detail. The pt is candidate for a right TKA. Pt is considering surgery in the future, but at this time, his pain is in well controlled. with mobic , cortisone injection and walking exercise, i  discussed conservative treatments with the pt such as cortisone injections, HA injections, PT, anti-inflammatories, and low impact exercise. He should continue to do low impact exercises. Pt may take Tylenol and NSAIDs as needed.  He opted in for repeat aspiration and cortisone injection today and tolerated procedure well.   The patient is a 73 year individual with end stage arthritis of their b/l knee joint. Based upon the patient's continued symptoms and failure to respond to conservative treatment (including HA injections, cortisone injections, over the counter medications, and PT) I have recommended a b/l total knee arthroplasty for this patient. A long discussion took place with the patient describing what a total joint replacement is and what the procedure would entail. A total knee arthroplasty model, similar to the implant that was used during the operation, was utilized to demonstrate and to discuss the various bearing surfaces of the implants. The hospitalization and post-operative care and rehabilitation were also discussed. The use of perioperative antibiotics and DVT prophylaxis were discussed. The risk, benefits and alternatives to a surgical intervention were discussed at length with the patient. The patient was also advised of risks related to the medical comorbidities, elevated body mass index (BMI), and smoking where applicable. We discussed how to reduce modifiable risk factors and encouraged smoking cessation were applicable.. A lengthy discussion took place to review the most common complications including but not limited to: deep vein thrombosis, pulmonary embolus, heart attack, stroke, infection, wound breakdown, numbness, damage to nerves, tendon, muscles, arteries or other blood vessels, death and other possible complications from anesthesia. The patient was told that we will take steps to minimize these risks by using sterile technique, antibiotics and DVT prophylaxis when appropriate and follow the patient postoperatively in the office setting to monitor progress. The possibility of recurrent pain, no improvement in pain and actual worsening of pain were also discussed with the patient. The discharge plan of care focused on the patient going home following surgery. The patient was encouraged to make the necessary arrangements to have someone stay with them when they are discharged home. Following discharge, a home care nurse was to the patient. The home care nurse would open the patients home care case and request home physical therapy services. Home physical therapy was to commence following discharge provided it was appropriate and covered by the health insurance benefit plan. The benefits of surgery were discussed with the patient including the potential for improving his current clinical condition through operative intervention. Alternatives to surgical intervention including continued conservative management were also discussed in detail. All questions were answered to the satisfaction of the patient. The treatment plan of care, as well as a model of a total knee arthroplasty equivalent to the one that will be used for their total joint replacement, was shared with the patient. The patient agreed to the plan of care as well as the use of implants in their total joint replacement.

## 2024-10-03 ENCOUNTER — APPOINTMENT (OUTPATIENT)
Dept: PULMONOLOGY | Facility: CLINIC | Age: 74
End: 2024-10-03
Payer: MEDICARE

## 2024-10-03 VITALS
HEART RATE: 79 BPM | HEIGHT: 71 IN | RESPIRATION RATE: 16 BRPM | SYSTOLIC BLOOD PRESSURE: 120 MMHG | WEIGHT: 190 LBS | BODY MASS INDEX: 26.6 KG/M2 | DIASTOLIC BLOOD PRESSURE: 86 MMHG | OXYGEN SATURATION: 97 %

## 2024-10-03 DIAGNOSIS — R09.82 POSTNASAL DRIP: ICD-10-CM

## 2024-10-03 DIAGNOSIS — C44.311 BASAL CELL CARCINOMA OF SKIN OF NOSE: ICD-10-CM

## 2024-10-03 DIAGNOSIS — I10 ESSENTIAL (PRIMARY) HYPERTENSION: ICD-10-CM

## 2024-10-03 DIAGNOSIS — R05.3 CHRONIC COUGH: ICD-10-CM

## 2024-10-03 DIAGNOSIS — Z87.891 PERSONAL HISTORY OF NICOTINE DEPENDENCE: ICD-10-CM

## 2024-10-03 PROCEDURE — 95012 NITRIC OXIDE EXP GAS DETER: CPT

## 2024-10-03 PROCEDURE — 99204 OFFICE O/P NEW MOD 45 MIN: CPT | Mod: 25

## 2024-10-03 RX ORDER — FLUTICASONE PROPIONATE 50 UG/1
50 SPRAY, METERED NASAL DAILY
Qty: 3 | Refills: 3 | Status: ACTIVE | COMMUNITY
Start: 2024-10-03 | End: 1900-01-01

## 2024-10-03 NOTE — ASSESSMENT
[FreeTextEntry1] : Chronic nightly cough; not related to position. Lungs clear. FENO WNL. DDx includes PND, asthma still possible. No GERD symptoms. Reports CT chest with only nodules.

## 2024-10-03 NOTE — HISTORY OF PRESENT ILLNESS
[Former] : former [>= 20 pack years] : >= 20 pack years [TextBox_4] : C/O cough for the past year. Happens at night only but not only when reclining. Feels mucous. Never had this before. Occ wheeze. No SANCHEZ.   Clearing throat. Has some PND.  Denies any GERD symptoms.  Hx HTN; on valsartan.  No medications attempted, OTC or Rx.   [facility] [place] Told nodules but stable.   Retired .   No pets.   Stopped smoking in 19902. Smoked 20 yrs.   No snoring. No EDS.   [YearQuit] : 1990s

## 2024-10-03 NOTE — PLAN
[TextEntry] : [facility] with [facility] to be done. For now, flonase. OTC nasal saline. He will bring me discs of CT chest. Annual flu vaccine. Further reccs will come.

## 2024-10-03 NOTE — PHYSICAL EXAM
[No Acute Distress] : no acute distress [Normal Oropharynx] : normal oropharynx [Supple] : supple [Normal Rate/Rhythm] : normal rate/rhythm [Normal S1, S2] : normal s1, s2 [No Resp Distress] : no resp distress [No Acc Muscle Use] : no acc muscle use [Normal Rhythm and Effort] : normal rhythm and effort [Clear to Auscultation Bilaterally] : clear to auscultation bilaterally [Benign] : benign [Normal Color/ Pigmentation] : normal color/ pigmentation

## 2024-10-23 ENCOUNTER — APPOINTMENT (OUTPATIENT)
Dept: PULMONOLOGY | Facility: CLINIC | Age: 74
End: 2024-10-23
Payer: MEDICARE

## 2024-10-23 VITALS
HEART RATE: 84 BPM | RESPIRATION RATE: 16 BRPM | OXYGEN SATURATION: 97 % | SYSTOLIC BLOOD PRESSURE: 122 MMHG | DIASTOLIC BLOOD PRESSURE: 74 MMHG

## 2024-10-23 DIAGNOSIS — Z01.818 ENCOUNTER FOR OTHER PREPROCEDURAL EXAMINATION: ICD-10-CM

## 2024-10-23 DIAGNOSIS — J43.9 EMPHYSEMA, UNSPECIFIED: ICD-10-CM

## 2024-10-23 DIAGNOSIS — R05.3 CHRONIC COUGH: ICD-10-CM

## 2024-10-23 PROCEDURE — 99214 OFFICE O/P EST MOD 30 MIN: CPT

## 2024-10-23 PROCEDURE — G2211 COMPLEX E/M VISIT ADD ON: CPT

## 2024-11-18 ENCOUNTER — OFFICE (OUTPATIENT)
Dept: URBAN - METROPOLITAN AREA CLINIC 115 | Facility: CLINIC | Age: 74
Setting detail: OPHTHALMOLOGY
End: 2024-11-18
Payer: MEDICARE

## 2024-11-18 DIAGNOSIS — H35.033: ICD-10-CM

## 2024-11-18 DIAGNOSIS — S05.02XA: ICD-10-CM

## 2024-11-18 DIAGNOSIS — H43.393: ICD-10-CM

## 2024-11-18 DIAGNOSIS — H34.8320: ICD-10-CM

## 2024-11-18 PROCEDURE — 92134 CPTRZ OPH DX IMG PST SGM RTA: CPT | Performed by: OPHTHALMOLOGY

## 2024-11-18 PROCEDURE — 99214 OFFICE O/P EST MOD 30 MIN: CPT | Performed by: OPHTHALMOLOGY

## 2024-11-18 ASSESSMENT — REFRACTION_CURRENTRX
OD_AXIS: 108
OS_OVR_VA: 20/
OS_CYLINDER: -2.25
OS_VPRISM_DIRECTION: PROGS
OS_ADD: +2.50
OS_AXIS: 095
OD_SPHERE: -2.25
OD_CYLINDER: -2.75
OD_OVR_VA: 20/
OS_SPHERE: -1.50
OD_ADD: +2.50
OD_VPRISM_DIRECTION: PROGS

## 2024-11-18 ASSESSMENT — KERATOMETRY
OS_K2POWER_DIOPTERS: 42.25
OD_K2POWER_DIOPTERS: 41.00
OD_K1POWER_DIOPTERS: 40.50
OS_AXISANGLE_DEGREES: 119
OS_K1POWER_DIOPTERS: 41.25
METHOD_AUTO_MANUAL: AUTO
OD_AXISANGLE_DEGREES: 035

## 2024-11-18 ASSESSMENT — CONFRONTATIONAL VISUAL FIELD TEST (CVF)
OD_FINDINGS: FULL
OS_FINDINGS: FULL

## 2024-11-18 ASSESSMENT — SUPERFICIAL PUNCTATE KERATITIS (SPK)
OD_SPK: T
OS_SPK: T

## 2024-11-18 ASSESSMENT — TONOMETRY
OD_IOP_MMHG: 16
OS_IOP_MMHG: 16

## 2024-11-18 ASSESSMENT — VISUAL ACUITY
OD_BCVA: 20/30-1
OS_BCVA: 20/25

## 2024-11-18 ASSESSMENT — REFRACTION_AUTOREFRACTION
OS_CYLINDER: -0.75
OS_AXIS: 084
OD_AXIS: 104
OD_SPHERE: +0.25
OS_SPHERE: PLANO
OD_CYLINDER: -0.75

## 2024-11-18 ASSESSMENT — LID EXAM ASSESSMENTS
OS_BLEPHARITIS: T 1+
OD_BLEPHARITIS: T 1+

## 2024-11-18 ASSESSMENT — CORNEAL TRAUMA - ABRASION: OS_ABRASION: PRESENT

## 2024-12-20 ENCOUNTER — APPOINTMENT (OUTPATIENT)
Dept: ORTHOPEDIC SURGERY | Facility: CLINIC | Age: 74
End: 2024-12-20
Payer: MEDICARE

## 2024-12-20 VITALS — WEIGHT: 190 LBS | TEMPERATURE: 98.7 F | HEIGHT: 71 IN | BODY MASS INDEX: 26.6 KG/M2

## 2024-12-20 DIAGNOSIS — M21.961 UNSPECIFIED ACQUIRED DEFORMITY OF RIGHT LOWER LEG: ICD-10-CM

## 2024-12-20 DIAGNOSIS — M17.11 UNILATERAL PRIMARY OSTEOARTHRITIS, RIGHT KNEE: ICD-10-CM

## 2024-12-20 DIAGNOSIS — M25.461 EFFUSION, RIGHT KNEE: ICD-10-CM

## 2024-12-20 PROCEDURE — 99214 OFFICE O/P EST MOD 30 MIN: CPT | Mod: 25

## 2024-12-20 PROCEDURE — 20610 DRAIN/INJ JOINT/BURSA W/O US: CPT | Mod: RT

## 2025-03-20 ENCOUNTER — OFFICE (OUTPATIENT)
Dept: URBAN - METROPOLITAN AREA CLINIC 115 | Facility: CLINIC | Age: 75
Setting detail: OPHTHALMOLOGY
End: 2025-03-20
Payer: MEDICARE

## 2025-03-20 DIAGNOSIS — H35.033: ICD-10-CM

## 2025-03-20 DIAGNOSIS — H43.393: ICD-10-CM

## 2025-03-20 DIAGNOSIS — H34.8320: ICD-10-CM

## 2025-03-20 PROCEDURE — 92012 INTRM OPH EXAM EST PATIENT: CPT | Performed by: OPHTHALMOLOGY

## 2025-03-20 PROCEDURE — 92134 CPTRZ OPH DX IMG PST SGM RTA: CPT | Performed by: OPHTHALMOLOGY

## 2025-03-20 ASSESSMENT — TONOMETRY
OS_IOP_MMHG: 20
OD_IOP_MMHG: 19

## 2025-03-20 ASSESSMENT — REFRACTION_CURRENTRX
OS_CYLINDER: -2.25
OS_ADD: +2.50
OD_VPRISM_DIRECTION: PROGS
OD_ADD: +2.50
OD_OVR_VA: 20/
OS_OVR_VA: 20/
OS_VPRISM_DIRECTION: PROGS
OS_SPHERE: -1.50
OD_AXIS: 108
OS_AXIS: 095
OD_SPHERE: -2.25
OD_CYLINDER: -2.75

## 2025-03-20 ASSESSMENT — CONFRONTATIONAL VISUAL FIELD TEST (CVF)
OS_FINDINGS: FULL
OD_FINDINGS: FULL

## 2025-03-20 ASSESSMENT — KERATOMETRY
OD_K1POWER_DIOPTERS: 40.50
OD_K2POWER_DIOPTERS: 41.00
OD_AXISANGLE_DEGREES: 035
OS_K2POWER_DIOPTERS: 42.25
OS_K1POWER_DIOPTERS: 41.25
OS_AXISANGLE_DEGREES: 119
METHOD_AUTO_MANUAL: AUTO

## 2025-03-20 ASSESSMENT — CORNEAL TRAUMA - ABRASION: OS_ABRASION: PRESENT

## 2025-03-20 ASSESSMENT — VISUAL ACUITY
OD_BCVA: 20/25
OS_BCVA: 20/20

## 2025-03-20 ASSESSMENT — LID EXAM ASSESSMENTS
OD_BLEPHARITIS: T 1+
OS_BLEPHARITIS: T 1+

## 2025-03-20 ASSESSMENT — SUPERFICIAL PUNCTATE KERATITIS (SPK)
OS_SPK: T
OD_SPK: T

## 2025-03-20 ASSESSMENT — REFRACTION_AUTOREFRACTION
OD_AXIS: 104
OD_CYLINDER: -0.75
OS_SPHERE: PLANO
OD_SPHERE: +0.25
OS_CYLINDER: -0.75
OS_AXIS: 084

## 2025-05-12 ENCOUNTER — NON-APPOINTMENT (OUTPATIENT)
Age: 75
End: 2025-05-12

## 2025-08-07 ENCOUNTER — APPOINTMENT (OUTPATIENT)
Dept: DERMATOLOGY | Facility: CLINIC | Age: 75
End: 2025-08-07
Payer: MEDICARE

## 2025-08-07 PROCEDURE — 99214 OFFICE O/P EST MOD 30 MIN: CPT
